# Patient Record
Sex: MALE | Race: BLACK OR AFRICAN AMERICAN | Employment: UNEMPLOYED | URBAN - METROPOLITAN AREA
[De-identification: names, ages, dates, MRNs, and addresses within clinical notes are randomized per-mention and may not be internally consistent; named-entity substitution may affect disease eponyms.]

---

## 2019-05-12 ENCOUNTER — APPOINTMENT (OUTPATIENT)
Dept: CT IMAGING | Age: 24
End: 2019-05-12
Payer: MEDICAID

## 2019-05-12 ENCOUNTER — HOSPITAL ENCOUNTER (OUTPATIENT)
Age: 24
Setting detail: OBSERVATION
Discharge: HOME | End: 2019-05-14
Attending: INTERNAL MEDICINE | Admitting: INTERNAL MEDICINE
Payer: MEDICAID

## 2019-05-12 DIAGNOSIS — G25.3 MYOCLONUS: Primary | ICD-10-CM

## 2019-05-12 PROBLEM — R56.9 SEIZURE (HCC): Status: ACTIVE | Noted: 2019-05-12

## 2019-05-12 LAB
ALBUMIN SERPL-MCNC: 4.8 G/DL (ref 3.5–4.6)
ALP BLD-CCNC: 70 U/L (ref 35–104)
ALT SERPL-CCNC: 19 U/L (ref 0–41)
AMPHETAMINE SCREEN, URINE: ABNORMAL
ANION GAP SERPL CALCULATED.3IONS-SCNC: 12 MEQ/L (ref 9–15)
AST SERPL-CCNC: 18 U/L (ref 0–40)
BARBITURATE SCREEN URINE: ABNORMAL
BASOPHILS ABSOLUTE: 0.1 K/UL (ref 0–0.2)
BASOPHILS RELATIVE PERCENT: 1.5 %
BENZODIAZEPINE SCREEN, URINE: ABNORMAL
BILIRUB SERPL-MCNC: <0.2 MG/DL (ref 0.2–0.7)
BILIRUBIN URINE: NEGATIVE
BLOOD, URINE: NEGATIVE
BUN BLDV-MCNC: 6 MG/DL (ref 6–20)
C-REACTIVE PROTEIN: <0.3 MG/L (ref 0–5)
CALCIUM SERPL-MCNC: 9.6 MG/DL (ref 8.5–9.9)
CANNABINOID SCREEN URINE: POSITIVE
CHLORIDE BLD-SCNC: 104 MEQ/L (ref 95–107)
CK MB: 2 NG/ML (ref 0–6.7)
CLARITY: CLEAR
CO2: 26 MEQ/L (ref 20–31)
COCAINE METABOLITE SCREEN URINE: ABNORMAL
COLOR: YELLOW
CREAT SERPL-MCNC: 1.08 MG/DL (ref 0.7–1.2)
CREATINE KINASE-MB INDEX: 0.4 % (ref 0–3.5)
EKG ATRIAL RATE: 75 BPM
EKG P AXIS: 56 DEGREES
EKG P-R INTERVAL: 156 MS
EKG Q-T INTERVAL: 368 MS
EKG QRS DURATION: 84 MS
EKG QTC CALCULATION (BAZETT): 410 MS
EKG R AXIS: 76 DEGREES
EKG T AXIS: 47 DEGREES
EKG VENTRICULAR RATE: 75 BPM
EOSINOPHILS ABSOLUTE: 0 K/UL (ref 0–0.7)
EOSINOPHILS RELATIVE PERCENT: 0.3 %
ETHANOL PERCENT: NORMAL G/DL
ETHANOL: <10 MG/DL (ref 0–0.08)
GFR AFRICAN AMERICAN: >60
GFR NON-AFRICAN AMERICAN: >60
GLOBULIN: 2.1 G/DL (ref 2.3–3.5)
GLUCOSE BLD-MCNC: 92 MG/DL (ref 70–99)
GLUCOSE URINE: NEGATIVE MG/DL
HCT VFR BLD CALC: 40.4 % (ref 42–52)
HEMOGLOBIN: 13.6 G/DL (ref 14–18)
KETONES, URINE: NEGATIVE MG/DL
LACTIC ACID: 1.2 MMOL/L (ref 0.5–2.2)
LEUKOCYTE ESTERASE, URINE: NEGATIVE
LYMPHOCYTES ABSOLUTE: 2.5 K/UL (ref 1–4.8)
LYMPHOCYTES RELATIVE PERCENT: 46.2 %
Lab: ABNORMAL
MAGNESIUM: 2.1 MG/DL (ref 1.7–2.4)
MCH RBC QN AUTO: 26.4 PG (ref 27–31.3)
MCHC RBC AUTO-ENTMCNC: 33.8 % (ref 33–37)
MCV RBC AUTO: 78.1 FL (ref 80–100)
MONOCYTES ABSOLUTE: 0.3 K/UL (ref 0.2–0.8)
MONOCYTES RELATIVE PERCENT: 5.9 %
NEUTROPHILS ABSOLUTE: 2.5 K/UL (ref 1.4–6.5)
NEUTROPHILS RELATIVE PERCENT: 46.1 %
NITRITE, URINE: NEGATIVE
OPIATE SCREEN URINE: ABNORMAL
PDW BLD-RTO: 14.4 % (ref 11.5–14.5)
PH UA: 5.5 (ref 5–9)
PHENCYCLIDINE SCREEN URINE: ABNORMAL
PLATELET # BLD: 201 K/UL (ref 130–400)
POTASSIUM SERPL-SCNC: 4.3 MEQ/L (ref 3.4–4.9)
PROLACTIN: 7.4 NG/ML (ref 4–15.2)
PROTEIN UA: NEGATIVE MG/DL
RBC # BLD: 5.17 M/UL (ref 4.7–6.1)
REJECTED TEST: NORMAL
SEDIMENTATION RATE, ERYTHROCYTE: 1 MM (ref 0–10)
SODIUM BLD-SCNC: 142 MEQ/L (ref 135–144)
SPECIFIC GRAVITY UA: 1.01 (ref 1–1.03)
TOTAL CK: 466 U/L (ref 0–190)
TOTAL PROTEIN: 6.9 G/DL (ref 6.3–8)
TROPONIN: <0.01 NG/ML (ref 0–0.01)
URINE REFLEX TO CULTURE: NORMAL
UROBILINOGEN, URINE: 0.2 E.U./DL
WBC # BLD: 5.5 K/UL (ref 4.8–10.8)

## 2019-05-12 PROCEDURE — 2580000003 HC RX 258: Performed by: NURSE PRACTITIONER

## 2019-05-12 PROCEDURE — 80053 COMPREHEN METABOLIC PANEL: CPT

## 2019-05-12 PROCEDURE — G0378 HOSPITAL OBSERVATION PER HR: HCPCS

## 2019-05-12 PROCEDURE — 6360000002 HC RX W HCPCS: Performed by: PHYSICIAN ASSISTANT

## 2019-05-12 PROCEDURE — 85652 RBC SED RATE AUTOMATED: CPT

## 2019-05-12 PROCEDURE — 83605 ASSAY OF LACTIC ACID: CPT

## 2019-05-12 PROCEDURE — 96365 THER/PROPH/DIAG IV INF INIT: CPT

## 2019-05-12 PROCEDURE — 82553 CREATINE MB FRACTION: CPT

## 2019-05-12 PROCEDURE — 70450 CT HEAD/BRAIN W/O DYE: CPT

## 2019-05-12 PROCEDURE — 96372 THER/PROPH/DIAG INJ SC/IM: CPT

## 2019-05-12 PROCEDURE — 82550 ASSAY OF CK (CPK): CPT

## 2019-05-12 PROCEDURE — 96374 THER/PROPH/DIAG INJ IV PUSH: CPT

## 2019-05-12 PROCEDURE — 86140 C-REACTIVE PROTEIN: CPT

## 2019-05-12 PROCEDURE — 81003 URINALYSIS AUTO W/O SCOPE: CPT

## 2019-05-12 PROCEDURE — 80307 DRUG TEST PRSMV CHEM ANLYZR: CPT

## 2019-05-12 PROCEDURE — 93005 ELECTROCARDIOGRAM TRACING: CPT

## 2019-05-12 PROCEDURE — 99284 EMERGENCY DEPT VISIT MOD MDM: CPT

## 2019-05-12 PROCEDURE — 96375 TX/PRO/DX INJ NEW DRUG ADDON: CPT

## 2019-05-12 PROCEDURE — 84484 ASSAY OF TROPONIN QUANT: CPT

## 2019-05-12 PROCEDURE — G0480 DRUG TEST DEF 1-7 CLASSES: HCPCS

## 2019-05-12 PROCEDURE — 36415 COLL VENOUS BLD VENIPUNCTURE: CPT

## 2019-05-12 PROCEDURE — 85025 COMPLETE CBC W/AUTO DIFF WBC: CPT

## 2019-05-12 PROCEDURE — 2580000003 HC RX 258: Performed by: PHYSICIAN ASSISTANT

## 2019-05-12 PROCEDURE — 84146 ASSAY OF PROLACTIN: CPT

## 2019-05-12 PROCEDURE — 83735 ASSAY OF MAGNESIUM: CPT

## 2019-05-12 RX ORDER — DIPHENHYDRAMINE HYDROCHLORIDE 50 MG/ML
50 INJECTION INTRAMUSCULAR; INTRAVENOUS ONCE
Status: COMPLETED | OUTPATIENT
Start: 2019-05-12 | End: 2019-05-12

## 2019-05-12 RX ORDER — SODIUM CHLORIDE 9 MG/ML
INJECTION, SOLUTION INTRAVENOUS CONTINUOUS
Status: DISCONTINUED | OUTPATIENT
Start: 2019-05-12 | End: 2019-05-14 | Stop reason: HOSPADM

## 2019-05-12 RX ORDER — LORAZEPAM 2 MG/ML
2 INJECTION INTRAMUSCULAR ONCE
Status: COMPLETED | OUTPATIENT
Start: 2019-05-12 | End: 2019-05-12

## 2019-05-12 RX ORDER — ONDANSETRON 2 MG/ML
4 INJECTION INTRAMUSCULAR; INTRAVENOUS EVERY 6 HOURS PRN
Status: DISCONTINUED | OUTPATIENT
Start: 2019-05-12 | End: 2019-05-14 | Stop reason: HOSPADM

## 2019-05-12 RX ORDER — SODIUM CHLORIDE 0.9 % (FLUSH) 0.9 %
10 SYRINGE (ML) INJECTION PRN
Status: DISCONTINUED | OUTPATIENT
Start: 2019-05-12 | End: 2019-05-14 | Stop reason: HOSPADM

## 2019-05-12 RX ORDER — LORAZEPAM 2 MG/ML
2 INJECTION INTRAMUSCULAR EVERY 4 HOURS PRN
Status: DISCONTINUED | OUTPATIENT
Start: 2019-05-12 | End: 2019-05-14 | Stop reason: HOSPADM

## 2019-05-12 RX ORDER — SODIUM CHLORIDE 0.9 % (FLUSH) 0.9 %
10 SYRINGE (ML) INJECTION EVERY 12 HOURS SCHEDULED
Status: DISCONTINUED | OUTPATIENT
Start: 2019-05-12 | End: 2019-05-14 | Stop reason: HOSPADM

## 2019-05-12 RX ADMIN — DIPHENHYDRAMINE HYDROCHLORIDE 50 MG: 50 INJECTION, SOLUTION INTRAMUSCULAR; INTRAVENOUS at 18:35

## 2019-05-12 RX ADMIN — LORAZEPAM 2 MG: 2 INJECTION, SOLUTION INTRAMUSCULAR; INTRAVENOUS at 18:35

## 2019-05-12 RX ADMIN — SODIUM CHLORIDE: 9 INJECTION, SOLUTION INTRAVENOUS at 21:49

## 2019-05-12 RX ADMIN — LEVETIRACETAM 1000 MG: 100 INJECTION, SOLUTION INTRAVENOUS at 20:56

## 2019-05-12 RX ADMIN — Medication 10 ML: at 21:48

## 2019-05-12 ASSESSMENT — ENCOUNTER SYMPTOMS
COLOR CHANGE: 0
SHORTNESS OF BREATH: 0
TROUBLE SWALLOWING: 0
EYE PAIN: 0
VOMITING: 0
APNEA: 0
ABDOMINAL PAIN: 0
BACK PAIN: 1
ALLERGIC/IMMUNOLOGIC NEGATIVE: 1
COUGH: 0

## 2019-05-12 ASSESSMENT — PAIN SCALES - GENERAL: PAINLEVEL_OUTOF10: 0

## 2019-05-12 NOTE — ED NOTES
Lab called and verified they received all bloodwork needed.       [facility], [address] [place] [address]  05/12/19 1943

## 2019-05-12 NOTE — ED NOTES
IV started, labs and urine obtained, labeled and sent. Pt tolerated well.  Pt to CT via cart with kehinde Ocasio, DEE  05/12/19 1935

## 2019-05-12 NOTE — ED PROVIDER NOTES
[facility] ED  eMERGENCY dEPARTMENTeNCOUnter      Pt Name: Fabiola Garg  MRN: [id number]  [place] 1995  Date ofevaluation: 5/12/2019  Provider: Braden Moreira PA-C    CHIEF COMPLAINT       Chief Complaint   Patient presents with    Other     Pt said he does not feel emotional but tears are coming out of his eyes. twitching while awake and asleep. 2 weeks ago pt had facial droop per father         HISTORY OF PRESENT ILLNESS   (Location/Symptom, Timing/Onset,Context/Setting, Quality, Duration, Modifying Factors, Severity)  Note limiting factors. Fabiola Garg is a 26 y.o. male who presents to the emergency department with complaints of involuntary body twitching/spasms, emotional lability, and dizziness, ongoing for the past 2-3 weeks. Dad states at symptom onset the patient had some drooping to the right side of the face, but that subsequently resolved. Patient denies any recent injuries. No recent illnesses. Only smokes marijuana, no other illicit drug use, alcohol abuse or tobacco use. Patient has no medical history, is not on any medications, and no history of episodes like this in the past. Denies any chest pain or shortness of breath. No abdominal pain, nausea or vomiting. Does have intermittent diarrhea. Poor appetite recently, but continues to drink water. Family states that jerking continues while patient is asleep. Varying severity. HPI    NursingNotes were reviewed. REVIEW OF SYSTEMS    (2-9 systems for level 4, 10 or more for level 5)     Review of Systems   Constitutional: Positive for appetite change and fatigue. Negative for diaphoresis and fever. HENT: Negative for hearing loss and trouble swallowing. Eyes: Negative for pain. Respiratory: Negative for apnea, cough and shortness of breath. Cardiovascular: Negative for chest pain. Gastrointestinal: Negative for abdominal pain and vomiting. Endocrine: Negative. Genitourinary: Negative for hematuria. Musculoskeletal: Positive for back pain. Negative for neck pain and neck stiffness. Skin: Negative for color change and rash. Allergic/Immunologic: Negative. Neurological: Positive for tremors and light-headedness. Negative for dizziness, syncope and numbness. Hematological: Negative. Psychiatric/Behavioral: Positive for decreased concentration. The patient is nervous/anxious. All other systems reviewed and are negative. Except as noted above the remainder of the review of systems was reviewed and negative. PAST MEDICAL HISTORY   History reviewed. No pertinent past medical history. SURGICALHISTORY     History reviewed. No pertinent surgical history. CURRENT MEDICATIONS       Previous Medications    No medications on file       ALLERGIES     Penicillins    FAMILY HISTORY     History reviewed. No pertinent family history.        SOCIAL HISTORY       Social History     Socioeconomic History    Marital status: Single     Spouse name: None    Number of children: None    Years of education: None    Highest education level: None   Occupational History    None   Social Needs    Financial resource strain: None    Food insecurity:     Worry: None     Inability: None    Transportation needs:     Medical: None     Non-medical: None   Tobacco Use    Smoking status: Former Smoker     Types: Cigarettes     Last attempt to quit: 3/12/2019     Years since quittin.1    Smokeless tobacco: Never Used   Substance and Sexual Activity    Alcohol use: Not Currently    Drug use: Yes     Types: Marijuana    Sexual activity: None   Lifestyle    Physical activity:     Days per week: None     Minutes per session: None    Stress: None   Relationships    Social connections:     Talks on phone: None     Gets together: None     Attends Bahai service: None     Active member of club or organization: None     Attends meetings of clubs or organizations: None     Relationship status: None    Intimate partner violence:     Fear of current or ex partner: None     Emotionally abused: None     Physically abused: None     Forced sexual activity: None   Other Topics Concern    None   Social History Narrative    None       SCREENINGS    Becky Coma Scale  Eye Opening: Spontaneous  Best Verbal Response: Oriented  Best Motor Response: Obeys commands  [place] Coma Scale Score: 15         PHYSICAL EXAM    (up to 7 for level 4, 8 or more for level 5)     ED Triage Vitals [05/12/19 1814]   BP Temp Temp Source Pulse Resp SpO2 Height Weight   (!) 145/88 98.3 °F (36.8 °C) Oral 111 18 100 % 6' 2\" (1.88 m) 200 lb (90.7 kg)       Physical Exam   Constitutional: He is oriented to person, place, and time. He appears well-developed and well-nourished. No distress. HENT:   Head: Normocephalic and atraumatic. Mouth/Throat: No oropharyngeal exudate. Eyes: Pupils are equal, round, and reactive to light. Conjunctivae and EOM are normal. No scleral icterus. Neck: Normal range of motion. Neck supple. No tracheal deviation present. Cardiovascular: Normal rate, normal heart sounds and intact distal pulses. Pulmonary/Chest: Effort normal and breath sounds normal. No respiratory distress. Abdominal: Soft. Bowel sounds are normal. He exhibits no distension. Musculoskeletal: Normal range of motion. Neurological: He is alert and oriented to person, place, and time. He displays tremor (myoclonic muscle contractions/tremors, disorganized). No cranial nerve deficit. He exhibits normal muscle tone. Gait abnormal. GCS eye subscore is 4. GCS verbal subscore is 5. GCS motor subscore is 6. Skin: Skin is warm and dry. No rash noted. He is not diaphoretic. No erythema. Psychiatric: He has a normal mood and affect. His behavior is normal. Judgment and thought content normal.   Nursing note and vitals reviewed.       DIAGNOSTIC RESULTS     EKG: All EKG's are interpreted by the Emergency Department Physician who either signs or Co-signsthis chart in the absence of a cardiologist.    NSR @ 75, No st elevation    RADIOLOGY:   Non-plain filmimages such as CT, Ultrasound and MRI are read by the radiologist. Plain radiographic images are visualized and preliminarily interpreted by the emergency physician with the below findings:    CT head- Neg    Interpretation per the Radiologist below, if available at the time ofthis note:    CT Head WO Contrast    (Results Pending)         ED BEDSIDE ULTRASOUND:   Performed by ED Physician - none    LABS:  Labs Reviewed   COMPREHENSIVE METABOLIC PANEL - Abnormal; Notable for the following components:       Result Value    Alb 4.8 (*)     Globulin 2.1 (*)     All other components within normal limits   CBC WITH AUTO DIFFERENTIAL - Abnormal; Notable for the following components:    Hemoglobin 13.6 (*)     Hematocrit 40.4 (*)     MCV 78.1 (*)     MCH 26.4 (*)     All other components within normal limits   CK - Abnormal; Notable for the following components: Total  (*)     All other components within normal limits   URINE DRUG SCREEN - Abnormal; Notable for the following components:    Cannabinoid Scrn, Ur POSITIVE (*)     All other components within normal limits   MAGNESIUM   LACTIC ACID, PLASMA   ETHANOL   URINE RT REFLEX TO CULTURE   TROPONIN   SEDIMENTATION RATE   C-REACTIVE PROTEIN   PROLACTIN   SPECIMEN REJECTION   CKMB & RELATIVE PERCENT       All other labs were within normal range or not returned as of this dictation. EMERGENCY DEPARTMENT COURSE and DIFFERENTIAL DIAGNOSIS/MDM:   Vitals:    Vitals:    05/12/19 1814 05/12/19 1950 05/12/19 2000   BP: (!) 145/88 116/65 120/67   Pulse: 111 67 70   Resp: 18 20 14   Temp: 98.3 °F (36.8 °C)     TempSrc: Oral     SpO2: 100% 100% 97%   Weight: 200 lb (90.7 kg)     Height: 6' 2\" (1.88 m)             MDM      REASSESSMENT      Patient did have improved symptoms after receiving IM Ativan and Benadryl.   I discussed the case with neurology who recommends giving 1 g of Keppra IV in admitting in the hospital for further workup and evaluation. CONSULTS:  None    PROCEDURES:  Unless otherwise noted below, none     Procedures    FINAL IMPRESSION      1. Myoclonus          DISPOSITION/PLAN   DISPOSITION Decision To Admit 05/12/2019 08:30:25 PM      PATIENT REFERREDTO:  No follow-up provider specified. DISCHARGEMEDICATIONS:  New Prescriptions    No medications on file     Controlled Substances Monitoring:     No flowsheet data found.     (Please note that portions of this note were completed with a voice recognition program.  Efforts were made to edit the dictations but occasionally words are mis-transcribed.)    Shana Oneil PA-C (electronically signed)  Attending Emergency Physician          Shana Oneil PA-C  05/12/19 5151

## 2019-05-13 ENCOUNTER — APPOINTMENT (OUTPATIENT)
Dept: MRI IMAGING | Age: 24
End: 2019-05-13
Payer: MEDICAID

## 2019-05-13 LAB
ANION GAP SERPL CALCULATED.3IONS-SCNC: 13 MEQ/L (ref 9–15)
BUN BLDV-MCNC: 8 MG/DL (ref 6–20)
CALCIUM SERPL-MCNC: 8.8 MG/DL (ref 8.5–9.9)
CHLORIDE BLD-SCNC: 110 MEQ/L (ref 95–107)
CO2: 23 MEQ/L (ref 20–31)
CREAT SERPL-MCNC: 1.05 MG/DL (ref 0.7–1.2)
GFR AFRICAN AMERICAN: >60
GFR NON-AFRICAN AMERICAN: >60
GLUCOSE BLD-MCNC: 98 MG/DL (ref 70–99)
HCT VFR BLD CALC: 39.2 % (ref 42–52)
HEMOGLOBIN: 13.2 G/DL (ref 14–18)
MCH RBC QN AUTO: 26.4 PG (ref 27–31.3)
MCHC RBC AUTO-ENTMCNC: 33.6 % (ref 33–37)
MCV RBC AUTO: 78.5 FL (ref 80–100)
PDW BLD-RTO: 14.4 % (ref 11.5–14.5)
PLATELET # BLD: 193 K/UL (ref 130–400)
POTASSIUM REFLEX MAGNESIUM: 4.4 MEQ/L (ref 3.4–4.9)
RBC # BLD: 5 M/UL (ref 4.7–6.1)
SODIUM BLD-SCNC: 146 MEQ/L (ref 135–144)
WBC # BLD: 5.8 K/UL (ref 4.8–10.8)

## 2019-05-13 PROCEDURE — 96372 THER/PROPH/DIAG INJ SC/IM: CPT

## 2019-05-13 PROCEDURE — 36415 COLL VENOUS BLD VENIPUNCTURE: CPT

## 2019-05-13 PROCEDURE — 6360000002 HC RX W HCPCS: Performed by: NURSE PRACTITIONER

## 2019-05-13 PROCEDURE — 6370000000 HC RX 637 (ALT 250 FOR IP): Performed by: PSYCHIATRY & NEUROLOGY

## 2019-05-13 PROCEDURE — 70551 MRI BRAIN STEM W/O DYE: CPT

## 2019-05-13 PROCEDURE — 85027 COMPLETE CBC AUTOMATED: CPT

## 2019-05-13 PROCEDURE — 95816 EEG AWAKE AND DROWSY: CPT

## 2019-05-13 PROCEDURE — 97166 OT EVAL MOD COMPLEX 45 MIN: CPT

## 2019-05-13 PROCEDURE — 96366 THER/PROPH/DIAG IV INF ADDON: CPT

## 2019-05-13 PROCEDURE — 80048 BASIC METABOLIC PNL TOTAL CA: CPT

## 2019-05-13 PROCEDURE — G0378 HOSPITAL OBSERVATION PER HR: HCPCS

## 2019-05-13 PROCEDURE — 96376 TX/PRO/DX INJ SAME DRUG ADON: CPT

## 2019-05-13 PROCEDURE — 97161 PT EVAL LOW COMPLEX 20 MIN: CPT

## 2019-05-13 PROCEDURE — 2580000003 HC RX 258: Performed by: NURSE PRACTITIONER

## 2019-05-13 RX ORDER — LORAZEPAM 2 MG/ML
1 INJECTION INTRAMUSCULAR
Status: COMPLETED | OUTPATIENT
Start: 2019-05-13 | End: 2019-05-13

## 2019-05-13 RX ADMIN — LEVETIRACETAM 500 MG: 100 INJECTION, SOLUTION INTRAVENOUS at 08:29

## 2019-05-13 RX ADMIN — SODIUM CHLORIDE 75 ML/HR: 9 INJECTION, SOLUTION INTRAVENOUS at 11:46

## 2019-05-13 RX ADMIN — ENOXAPARIN SODIUM 40 MG: 40 INJECTION SUBCUTANEOUS at 08:28

## 2019-05-13 RX ADMIN — LORAZEPAM 1 MG: 2 INJECTION INTRAMUSCULAR; INTRAVENOUS at 13:45

## 2019-05-13 RX ADMIN — LEVETIRACETAM 750 MG: 500 TABLET ORAL at 20:40

## 2019-05-13 ASSESSMENT — PAIN SCALES - GENERAL
PAINLEVEL_OUTOF10: 0

## 2019-05-13 NOTE — PROGRESS NOTES
Hospitalist Progress Note      PCP: No primary care provider on file. Date of Admission: 5/12/2019    Chief Complaint:    Chief Complaint   Patient presents with    Other     Pt said he does not feel emotional but tears are coming out of his eyes. twitching while awake and asleep. 2 weeks ago pt had facial droop per father     Subjective: :  Patient admitted with twitching of left side of body  Reports still has twitching and left side of body has decreased sensation. Medications:  Reviewed    Infusion Medications    sodium chloride 75 mL/hr at 05/12/19 0859     Scheduled Medications    sodium chloride flush  10 mL Intravenous 2 times per day    enoxaparin  40 mg Subcutaneous Daily    levetiracetam  500 mg Intravenous Q12H     PRN Meds: LORazepam, sodium chloride flush, magnesium hydroxide, ondansetron, LORazepam      Intake/Output Summary (Last 24 hours) at 5/13/2019 1023  Last data filed at 5/13/2019 0624  Gross per 24 hour   Intake 1000 ml   Output --   Net 1000 ml       Exam:    /65   Pulse 55   Temp 97.9 °F (36.6 °C) (Oral)   Resp 16   Ht 6' 3\" (1.905 m)   Wt 203 lb 0.7 oz (92.1 kg)   SpO2 100%   BMI 25.38 kg/m²     General appearance: No apparent distress  HEENT:  Conjunctivae/corneas clear. Neck: Supple, with full range of motion. Respiratory:  Normal respiratory effort. Clear to auscultation, bilaterally without Rales/Wheezes/Rhonchi. Cardiovascular: Regular rate and rhythm with normal S1/S2 without murmurs, rubs or gallops. Abdomen: Soft, non-tender, non-distended with normal bowel sounds. Musculoskeletal: No clubbing, cyanosis or edema bilaterally. Neuro: alert, EOMI, no facial palsy, strength 5/5 both sides. He was sleeping and had no twitching when I went in to the room. He started having significant jerking of the left side after I entered the room.        Labs:   Recent Labs     05/12/19  1845 05/13/19  0531   WBC 5.5 5.8   HGB 13.6* 13.2*   HCT 40.4* 39.2*   PLT Diet: DIET GENERAL; No Caffeine    Code Status: Full Code      Electronically signed by Ramon Muñoz MD on 5/13/2019 at 10:23 AM

## 2019-05-13 NOTE — PLAN OF CARE
See OT evaluation for all goals and OT POC.  Electronically signed by TRESSA Diego/L on 5/13/2019 at 3:43 PM

## 2019-05-13 NOTE — PROGRESS NOTES
Perfect served Dr Veronique Sidhu of pt's hr. Ramos Sewell goes down to 40s, lowest 39 but not sustaining, goes up to 46s. Pt sleeping. will monitor. No new ordered received.

## 2019-05-13 NOTE — CONSULTS
Consult to Neurology  Consult performed by: Bhanu Wang MD  Consult ordered by: Daniel Higgins, APRN - CNP      Myoclonus   R/o Juvenile myoclonic epilepsy  Psychogenic myoclonus    Alexandrecarmel Clark MD, Sidney Ni, American Board of Psychiatry & Neurology  Board Certified in Vascular Neurology  Board Certified in Neuromuscular Medicine  Certified in [address]

## 2019-05-13 NOTE — ED NOTES
Report called to [address] [place] [address] on [facility], tele applied, and pt ready for transport to [facility]     Adrian Sol RN  05/12/19 8129

## 2019-05-13 NOTE — H&P
Hospital Medicine History & Physical      PCP: No primary care provider on file. Date of Admission: 5/12/2019    Date of Service: Pt seen/examined on 5/12/19 and Admitted to Inpatient with expected LOS greater than two midnights due to medical therapy. Chief Complaint:  Tremors       History Of Present Illness:      26 y.o. male who presented to [facility] ED with complaint of tremors. He has no medical history and denies use of daily medications. He states use of marijuana occasionally and denies tobacco use and alcohol. Per patient he has been having spasm/twitching of left side extremities intermittently for the last 2-3 weeks. He has also been having emotional lability and states sometime when he talks he feels like he is about to cry for no reason. He denies feelings of depression. He denies any trauma/injury and denies hitting head. States twitching can last for a few minutes and resolve on their own other times that will last longer. unclear of what triggers them. Patient states he will have left side facial numbness along with left side tongue numbness intermittently as well. Per ED provider patient has witnessed twitching/spasm of left upper and lower extremities for at least 20 minutes in ED with relief finally from ativan. Patient denies cp./sob/n/v/d/fever/chills/rash. Past Medical History:      History reviewed. No pertinent past medical history. Past Surgical History:      History reviewed. No pertinent surgical history. Medications Prior to Admission:      Prior to Admission medications    Not on File       Allergies:  Penicillins    Social History:      The patient currently lives with parents      TOBACCO:   reports that he quit smoking about 2 months ago. His smoking use included cigarettes. He has never used smokeless tobacco.  ETOH:   reports that he drank alcohol. Family History:       Reviewed in detail and negative for DM, CAD, Cancer, CVA. History reviewed. No pertinent family history. REVIEW OF SYSTEMS:   Pertinent positives as noted in the HPI. All other systems reviewed and negative. PHYSICAL EXAM:    /86   Pulse 67   Temp 98.3 °F (36.8 °C) (Oral)   Resp 20   Ht 6' 2\" (1.88 m)   Wt 200 lb (90.7 kg)   SpO2 97%   BMI 25.68 kg/m²     General appearance:  No apparent distress, appears stated age and cooperative. HEENT:  Normal cephalic, atraumatic without obvious deformity. Pupils equal, round, and reactive to light. Extra ocular muscles intact. Conjunctivae/corneas clear. Neck: Supple, with full range of motion. No jugular venous distention. Trachea midline. Respiratory:  Normal respiratory effort. Clear to auscultation, bilaterally   Cardiovascular:  Regular rate and rhythm with normal S1/S2  Abdomen: Soft, non-tender, non-distended with normal bowel sounds. Musculoskeletal:  No clubbing, cyanosis or edema bilaterally. Skin: Skin color, texture, turgor normal.  No rashes or lesions. Neurologic:  Neurovascularly intact without any focal sensory/motor deficits. Cranial nerves: II-XII intact, grossly non-focal. Noted left upper and lower twitching/tremors, no facial droop or slurred speech, equal  and strengths with all extremities, numbness to left side of face and left side of tongue. Psychiatric:  Alert and oriented, thought content appropriate, normal insight  Capillary Refill: Brisk,< 3 seconds   Peripheral Pulses: +2 palpable, equal bilaterally       Labs:     Recent Labs     05/12/19 1845   WBC 5.5   HGB 13.6*   HCT 40.4*        Recent Labs     05/12/19 1845      K 4.3      CO2 26   BUN 6   CREATININE 1.08   CALCIUM 9.6     Recent Labs     05/12/19 1845   AST 18   ALT 19   BILITOT <0.2   ALKPHOS 70     No results for input(s): INR in the last 72 hours.   Recent Labs     05/12/19 1845   CKTOTAL 466*   TROPONINI <0.010       Urinalysis:      Lab Results   Component Value Date    NITRU Negative 05/12/2019 dehydrated.      Darol Dose, [facility]

## 2019-05-13 NOTE — CONSULTS
[address]                      [address], [address] [facility]                                  CONSULTATION    PATIENT NAME: Tam Ritchie                      :        1995  MED REC NO:   [id number]                            ROOM:       A299  ACCOUNT NO:   [de-identified]                           ADMIT DATE: 2019  PROVIDER:     Pablo Maria MD    CONSULT DATE:  2019    ATTENDING:  Belkys Velasco MD.    HISTORY OF PRESENT ILLNESS:  This is a 28-year-old right-handed  gentleman who is admitted with history of jerking episodes. The patient  had significant jerking in the emergency room. We were called regarding  the same. We had given him lot of Keppra. Here, he is somewhat better. The patient does not use any recreational drugs except for marijuana. When seen today, he was having some jerking. We tapped his knee  reflexes, and he had increased jerking and significant myoclonus, though  again when I did not tap his knee, he still had the reaction. He is  complaining of some emotional ability and dizziness ongoing for 2 to 3  weeks. The patient was noted to have some droopiness of the right face,  but subsequently resolved. This was not quite well documented. He  denies any headaches, nausea, vomiting, bleeding, bruising, choking,  drooling, falls, injuries, or trauma. He complains of left leg  weakness, which is intermittent. He denies any significant back pain. He has no injury or history of trauma. MEDICATIONS:  His medications are listed. We gave him Keppra only. He  was not taking anything other than he is on lorazepam when needed. PHYSICAL EXAMINATION:  GENERAL:  He is tall, well-built gentleman. General examination reveals  no skin lesions or skin marks. There is no pedal edema. There is no  cyanosis or clubbing. NECK:  Supple. There are no meningeal signs.   CARDIOVASCULAR SYSTEM:  S1 and S2 are normal.  No murmurs appreciated. No carotid bruits. RESPIRATORY SYSTEM:  Clear to auscultation. CNS:  He is awake and alert and oriented to self, place, month, and  year. Pupils are equal and reactive. Eye movements are conjugate. Speech is normal.  Tongue is midline. Palate moves symmetrically. EXTREMITIES:  No pronator drift. Fine finger movements are symmetrical.  Strength is 5/5. Reflexes are 2+. No myelopathic signs are notable. On tapping his reflexes, he had significant myoclonic jerks, but even  without the tapping, he had a jerk. LAB EXAMINATION:  Therefore reviewed. His CPK level is 466 with a white  count of 5.5, hemoglobin 13.6, hematocrit 40.4, and platelets of  894,218. Sodium 142, potassium 4.3, BUN of 6, creatinine 1.08. Prolactin level is 7.4.  C-reactive protein was 0.3. Magnesium was 2.1. Lactic acid of 1.2. IMPRESSION:  Myoclonic jerks of unclear etiology. 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 Underlying _____ on myoclonic epilepsy  must be ruled out. An MRI of the brain and EEG are therefore  recommended. 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 We will keep an eye on this since he does have some stressors as it  appears from his chart. There are some other symptoms, which quite do  not fit into the myoclonus category. Segmental myoclonus can occur with  cervical compressions, though he does not have any myelopathic signs. We will keep an eye on this since he _____ something. Thank you Dr. Naty Chaidez for asking us to assist in the care of this  patient.       Lyndsey Domínguez MD    D: 05/13/2019 12:07:09       T: 05/13/2019 12:54:53     [email or web address]  Job#: [id number]     Doc#: [id number]    CC:

## 2019-05-13 NOTE — CARE COORDINATION
Patient is from home with family, independent. He qualifies for 2 weeks new meds through [employer]. Plan is home at discharge.   Electronically signed by Chhaya Fernandez RN on 5/13/19 at 12:16 PM

## 2019-05-13 NOTE — PROGRESS NOTES
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 History reviewed. No pertinent past medical history. History reviewed. No pertinent surgical history.     Restrictions  Restrictions/Precautions: Fall Risk, Seizure     Safety Devices: Safety Devices  Safety Devices in place: Not Applicable(Bed alarm not on when OT arrived)    Subjective  Pre Treatment Pain Screening  Pain at present: 0  Scale Used: Numeric Score  Intervention List: Patient able to continue with treatment    Pain Reassessment:   Pain Assessment  Patient Currently in Pain: No  Pain Assessment: 0-10  Pain Level: 0       Orientation  Orientation  Overall Orientation Status: Within Functional Limits    Prior Level of Function:  Social/Functional History  Lives With: Family  Type of Home: House  Home Layout: Multi-level, Bed/Bath upstairs(5-6 steps with landings between levels)  Home Access: Stairs to enter with rails  Entrance Stairs - Number of Steps: 3  Bathroom Shower/Tub: Tub/Shower unit  Bathroom Toilet: Standard  ADL Assistance: Independent  Homemaking Assistance: Independent  Ambulation Assistance: Independent(No AD)  Transfer Assistance: Independent  Active : No  Occupation: Unemployed  Additional Comments: Pt. states he is currently not working, the tremors have been impacting his mobility only the last few days    OBJECTIVE:     Orientation Status:  Orientation  Overall Orientation Status: Within Functional Limits    Observation:  Observation/Palpation  Posture: Good  Observation: Pt. alert and attentive, sitting upright in bed; occasional twitching with intentional movement    Cognition Status:  Cognition  Overall Cognitive Status: WFL    Perception Status:  Perception  Overall Perceptual Status: [facility]    Sensation Status:  Sensation  Overall Sensation Status: WNL    Vision and Hearing Status:  Vision  Vision: Within Functional Limits  Hearing  Hearing: Within functional limits     ROM:   LUE AROM (degrees)  LUE AROM : WFL  Left Hand AROM (degrees)  Left Hand AROM: WFL  RUE AROM (degrees)  RUE AROM : WFL  Right Hand AROM (degrees)  Right Hand AROM: WFL    Strength:  LUE Strength  Gross LUE Strength: WNL  L Hand Grasp: 5/5  L Hand Release: 5/5  LUE Strength Comment: 5/5 all planes  RUE Strength  R Hand Grasp: 5/5  R Hand Release: 5/5  RUE Strength Comment: 5/5 all planes    Coordination, Tone, Quality of Movement: Tone RUE  RUE Tone: Normotonic  Tone LUE  LUE Tone: Normotonic  Coordination  Movements Are Fluid And Coordinated: No  Coordination and Movement description: Gross motor impairments, Decreased speed, Decreased accuracy, Right UE, Left UE  Quality of Movement Other  Comment: Occasional twitching with all purposeful movements    Hand Dominance:  Hand Dominance  Hand Dominance: Right    ADL Status:  ADL  Feeding: Independent  Grooming: Setup  UE Bathing: Setup  LE Bathing: Contact guard assistance  UE Dressing: Setup  LE Dressing: Contact guard assistance  Toileting: Contact guard assistance  Additional Comments: Simulated ADLs as above; pt's twitching impact his ability to safely perform all mobility and ADL activities. Pt. requires increased time for all tasks.    Toilet Transfers  Toilet Transfer: Unable to assess  Toilet Transfers Comments: Pt. declined need; anticipate SBA with increased time       Functional Mobility:  Functional Mobility  Functional - Mobility Device: No device  Activity: To/from bathroom  Assist Level: Contact guard assistance  Functional Mobility Comments: Varies SBA to CGA; twitching impacts his safety  Transfers  Sit to stand: Stand by assistance  Stand to sit: Stand by assistance    Bed Mobility  Bed mobility  Supine to Sit: Stand by assistance  Sit to Supine: Stand by assistance    Seated and Standing Balance:  Balance  Sitting Balance: Supervision  Standing Balance: Stand by assistance(Close SBA)    Functional Endurance:  Activity Tolerance  Activity Tolerance: Patient Tolerated treatment well    D/C Recommendations:    Equipment Recommendations:  Continue to assess    OT Follow Up:  OT D/C RECOMMENDATIONS  REQUIRES OT FOLLOW UP: Yes       Assessment/Discharge Disposition:  Assessment: Pt is a 26year old man from home with family who presents to [facility] with the above deficits which impact his ability to perform ADLs and IADLs. Pt. would benefit from skilled OT to maximize independence and safety with ADL tasks.    Performance deficits / Impairments: Decreased functional mobility , Decreased ADL status, Decreased safe awareness, Decreased balance, Decreased high-level IADLs, Decreased coordination  Prognosis: Good  Discharge Recommendations: Continue to assess pending progress  History: Pt's medical history is moderately complex  Exam: Pt. has 6 performance deficits  Assistance / Modification: Pt. requires SBA to CGA    Six Click Score   How much help for putting on and taking off regular lower body clothing?: A Little  How much help for Bathing?: A Little  How much help for Toileting?: A Little  How much help for putting on and taking off regular upper body clothing?: None  How much help for taking care of personal grooming?: None  How much help for eating meals?: None  AM-[facility] Inpatient Daily Activity Raw Score: 21  AM-PAC Inpatient ADL T-Scale Score : 44.27  ADL Inpatient CMS 0-100% Score: 32.79    Plan:  Plan  Times per week: 1-3x  Plan weeks: Length of acute stay  Current Treatment Recommendations: Endurance Training, Balance Training, Functional Mobility Training, Patient/Caregiver Education & Training, Neuromuscular Re-education, Equipment Evaluation, Education, & procurement, Self-Care / ADL, Home Management Training, Safety Education & Training    Goals:   Patient will:    - Improve functional endurance to tolerate/complete 60 mins of ADL's  - Be Independent in UB ADLs   - Be Mod I in LB ADLs  - Be Mod I in ADL transfers without LOB  - Be Mod I in toileting tasks  - Access appropriate D/C site with as few architectural barriers as possible. - Sequence self-care tasks with no verbal cues for safety    Patient Goal: Patient goals : \"I want to get home\"     Discussed and agreed upon: Yes Comments:     Therapy Time:   OT Individual Minutes  Time In: 1525  Time Out: [address]  Minutes: 8    Electronically signed by:     SKYLER Peñaloza  5/13/2019, 3:42 PM

## 2019-05-13 NOTE — FLOWSHEET NOTE
Medicated patient with Ativan 1 mg iv once patient on cot to go down to MRI. Transport taking patient down now.

## 2019-05-13 NOTE — ED NOTES
Pharmacy called for One Rodney Phelps, [facility]  05/12/19 9729

## 2019-05-13 NOTE — PROGRESS NOTES
Physical Therapy Med Surg Initial Assessment  Facility/Department: [facility]  Room: N227/N227-01       NAME: Zackery Comment  : 1995 (26 y.o.)  MRN: [id number]  CODE STATUS: Full Code    Date of Service: 2019    Patient Diagnosis(es): Seizure [facility]) [R56.9]   Chief Complaint   Patient presents with    Other     Pt said he does not feel emotional but tears are coming out of his eyes. twitching while awake and asleep. 2 weeks ago pt had facial droop per father     Patient Active Problem List    Diagnosis Date Noted    Seizure [facility]) 2019        History reviewed. No pertinent past medical history. History reviewed. No pertinent surgical history. Chart Reviewed: Yes  Patient assessed for rehabilitation services?: Yes  General Comment  Comments: Pt agreeable to PT evaluation. Restrictions:  Restrictions/Precautions: Fall Risk, Seizure     SUBJECTIVE: Subjective: \"I have trouble telling my leg what to do. \"       Post Treatment Pain Screening:   Pain Screening  Patient Currently in Pain: No(Simultaneous filing. User may not have seen previous data.)  Pain Assessment  Pain Assessment: 0-10(Simultaneous filing. User may not have seen previous data.)  Pain Level: 0(Simultaneous filing.  User may not have seen previous data.)    Prior Level of Function:  Social/Functional History  Lives With: Family  Type of Home: House  Home Layout: Multi-level, Bed/Bath upstairs(5-6 steps with landings between levels)  Home Access: Stairs to enter with rails  Entrance Stairs - Number of Steps: 3  Bathroom Shower/Tub: Tub/Shower unit  Bathroom Toilet: Standard  ADL Assistance: Independent  Homemaking Assistance: Independent  Ambulation Assistance: Independent(No AD)  Transfer Assistance: Independent  Active : No  Occupation: Unemployed  Additional Comments: Pt. states he is currently not working, the tremors have been impacting his mobility only the last few days    OBJECTIVE:   Vision/Hearing:  Vision: Within Functional Limits  Hearing: Within functional limits    Cognition:  Overall Orientation Status: Within Normal Limits  Follows Commands: Within Functional Limits    Observation/Palpation  Posture: Good  Observation: occasional twitching, jerking motion    ROM:  RLE PROM: WFL  LLE PROM: WFL    Strength:  Strength RLE  Comment: grossly 4/5  Strength LLE  Comment: grossly 4/5    Neuro:  Balance  Sitting - Static: Good  Sitting - Dynamic: Good  Standing - Static: Good;-  Standing - Dynamic: Good;-        Sensation  Overall Sensation Status: WNL    Bed mobility  Supine to Sit: Stand by assistance  Sit to Supine: Stand by assistance    Transfers  Sit to Stand: Stand by assistance  Stand to sit: Stand by assistance    Ambulation  Ambulation?: Yes  Ambulation 1  Surface: level tile  Device: No Device  Assistance: Stand by assistance;Contact guard assistance  Quality of Gait: LOB x 1 but able to recover with CGA, step to gait pattern, occasionally twitching in LEs during ambulation  Distance: 15 ft x 2           ASSESSMENT:   Body structures, Functions, Activity limitations: Decreased functional mobility ; Decreased balance;Decreased coordination  Decision Making: Low Complexity  History: low  Exam: medium  Clinical Presentation: evolving    Prognosis: Good  Patient Education: goals of PT    DISCHARGE RECOMMENDATIONS:  Discharge Recommendations: Continue to assess pending progress    Assessment: Patient demonstrates impaired gait with LOB needing CGA to maintain safety. Further skilled PT recommended to continue to work on gait and balance to return to independent level.   REQUIRES PT FOLLOW UP: Yes      PLAN OF CARE:  Plan  Times per week: 3-6  Current Treatment Recommendations: Strengthening, Balance Training, Functional Mobility Training, Endurance Training, Transfer Training, Gait Training, Stair training, Neuromuscular Re-education, Home Exercise Program, Safety Education & Training, Patient/Caregiver Education & Training, Equipment Evaluation, Education, & procurement  Safety Devices  Type of devices: All fall risk precautions in place, Call light within reach    Goals:  Long term goals  Long term goal 1: Patient will be independent with bed mobility. Long term goal 2: Patient will be independent with transfers. Long term goal 3: Patient will be independent with 150ft of gait using LRD. Long term goal 4: Patient will ascend/descend 4-6inch steps x 3 using HR independently.     [facility] (6 CLICK) BASIC MOBILITY  AM-PAC Inpatient Mobility Raw Score : 22     Therapy Time:   Individual   Time In 1523   Time Out 1533   Minutes Jaden Villanueva, 05/13/19 at 3:46 PM

## 2019-05-13 NOTE — ED NOTES
Pt talking on telephone to a woman stating \" I dont even want to be here, they made me come here and I dont even want to be here. Person on phone asking pt what medication he is getting and what doses. Pt stating they are giving me all this stuff and what if I dont even need this stuff. Pt asking about medication, Medication explained to pt, pt stating what if I'm not even having seizures, asked pt if he would like to speak with physician/PA again pt states he wants to talk to them. Magda Verma I PA notified.       [address] [place] [address]  05/12/19 6062

## 2019-05-13 NOTE — ED NOTES
Patient A&Ox4, frequent twitching noted, patient states he feels like something is crawling on his forehead, c/o intermittent tingling of left side of body for several weeks     Guillermo Mcfadden RN  05/12/19 2010       Guillermo Mcfadden RN  05/12/19 2016

## 2019-05-13 NOTE — PROGRESS NOTES
[facility] Occupational Therapy      Date: 2019  Patient Name: Portillo Gutierrez        MRN: [id number]  Account: [de-identified]   : 1995  (26 y.o.)  Room: N227N227-01    Pt. is of observation status. To comply with medicare and insurance regulations, to see patient we require updated orders including a valid OT treatment diagnosis. Please reorder as appropriate.      Electronically signed by SKYLER Lehman on 2019 at 10:23 AM

## 2019-05-13 NOTE — PROGRESS NOTES
2135 Pt to 227 via cart. Oriented to room, call light. Pt denies any pain, no chest pain, sob. Family at bedside. 2150 Pt eating, no distress noted. 80 Dr Oliver Vo at bedside. Notified of assessment. Pt stated that his whole left side sensation is decreased, and per pt's dad that pt had facial drop 2 weeks ago and he has not been \"right\" for past few months. Pt also lost about 25 lbs.

## 2019-05-14 VITALS
HEIGHT: 75 IN | OXYGEN SATURATION: 100 % | BODY MASS INDEX: 25.25 KG/M2 | DIASTOLIC BLOOD PRESSURE: 78 MMHG | RESPIRATION RATE: 18 BRPM | SYSTOLIC BLOOD PRESSURE: 122 MMHG | HEART RATE: 74 BPM | TEMPERATURE: 98.6 F | WEIGHT: 203.04 LBS

## 2019-05-14 LAB
ANION GAP SERPL CALCULATED.3IONS-SCNC: 9 MEQ/L (ref 9–15)
BUN BLDV-MCNC: 9 MG/DL (ref 6–20)
CALCIUM SERPL-MCNC: 8.9 MG/DL (ref 8.5–9.9)
CHLORIDE BLD-SCNC: 106 MEQ/L (ref 95–107)
CO2: 27 MEQ/L (ref 20–31)
CREAT SERPL-MCNC: 1.13 MG/DL (ref 0.7–1.2)
GFR AFRICAN AMERICAN: >60
GFR NON-AFRICAN AMERICAN: >60
GLUCOSE BLD-MCNC: 96 MG/DL (ref 70–99)
HCT VFR BLD CALC: 40.1 % (ref 42–52)
HEMOGLOBIN: 13.6 G/DL (ref 14–18)
MCH RBC QN AUTO: 26.2 PG (ref 27–31.3)
MCHC RBC AUTO-ENTMCNC: 33.8 % (ref 33–37)
MCV RBC AUTO: 77.5 FL (ref 80–100)
PDW BLD-RTO: 14.6 % (ref 11.5–14.5)
PLATELET # BLD: 198 K/UL (ref 130–400)
POTASSIUM REFLEX MAGNESIUM: 5 MEQ/L (ref 3.4–4.9)
RBC # BLD: 5.18 M/UL (ref 4.7–6.1)
SODIUM BLD-SCNC: 142 MEQ/L (ref 135–144)
TSH SERPL DL<=0.05 MIU/L-ACNC: 1.98 UIU/ML (ref 0.44–3.86)
WBC # BLD: 5.4 K/UL (ref 4.8–10.8)

## 2019-05-14 PROCEDURE — 84443 ASSAY THYROID STIM HORMONE: CPT

## 2019-05-14 PROCEDURE — 86225 DNA ANTIBODY NATIVE: CPT

## 2019-05-14 PROCEDURE — 97116 GAIT TRAINING THERAPY: CPT

## 2019-05-14 PROCEDURE — 6360000002 HC RX W HCPCS: Performed by: NURSE PRACTITIONER

## 2019-05-14 PROCEDURE — 36415 COLL VENOUS BLD VENIPUNCTURE: CPT

## 2019-05-14 PROCEDURE — 99243 OFF/OP CNSLTJ NEW/EST LOW 30: CPT | Performed by: PSYCHIATRY & NEUROLOGY

## 2019-05-14 PROCEDURE — 80048 BASIC METABOLIC PNL TOTAL CA: CPT

## 2019-05-14 PROCEDURE — 6370000000 HC RX 637 (ALT 250 FOR IP): Performed by: PSYCHIATRY & NEUROLOGY

## 2019-05-14 PROCEDURE — 2580000003 HC RX 258: Performed by: NURSE PRACTITIONER

## 2019-05-14 PROCEDURE — 85027 COMPLETE CBC AUTOMATED: CPT

## 2019-05-14 PROCEDURE — G0378 HOSPITAL OBSERVATION PER HR: HCPCS

## 2019-05-14 PROCEDURE — 83516 IMMUNOASSAY NONANTIBODY: CPT

## 2019-05-14 PROCEDURE — 96372 THER/PROPH/DIAG INJ SC/IM: CPT

## 2019-05-14 RX ADMIN — LEVETIRACETAM 750 MG: 500 TABLET ORAL at 09:18

## 2019-05-14 RX ADMIN — SODIUM CHLORIDE: 9 INJECTION, SOLUTION INTRAVENOUS at 05:43

## 2019-05-14 RX ADMIN — ENOXAPARIN SODIUM 40 MG: 40 INJECTION SUBCUTANEOUS at 09:18

## 2019-05-14 ASSESSMENT — PAIN SCALES - GENERAL
PAINLEVEL_OUTOF10: 0
PAINLEVEL_OUTOF10: 0

## 2019-05-14 ASSESSMENT — ENCOUNTER SYMPTOMS
CHEST TIGHTNESS: 0
SHORTNESS OF BREATH: 0
DIARRHEA: 0
ABDOMINAL DISTENTION: 0
TROUBLE SWALLOWING: 0
COUGH: 0
VOMITING: 0
NAUSEA: 0
WHEEZING: 0
ABDOMINAL PAIN: 0
CONSTIPATION: 0
COLOR CHANGE: 0
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
## 2019-05-14 NOTE — PROGRESS NOTES
Hospitalist Progress Note      PCP: No primary care provider on file. Date of Admission: 5/12/2019    Subjective: :  Denies any complaints    Medications:  Reviewed    Infusion Medications    sodium chloride 75 mL/hr at 05/14/19 0543     Scheduled Medications    levETIRAcetam  750 mg Oral BID    sodium chloride flush  10 mL Intravenous 2 times per day    enoxaparin  40 mg Subcutaneous Daily     PRN Meds: sodium chloride flush, magnesium hydroxide, ondansetron, LORazepam      Intake/Output Summary (Last 24 hours) at 5/14/2019 0816  Last data filed at 5/13/2019 1942  Gross per 24 hour   Intake 1750 ml   Output --   Net 1750 ml       Exam:    /78   Pulse 74   Temp 98.6 °F (37 °C) (Oral)   Resp 18   Ht 6' 3\" (1.905 m)   Wt 203 lb 0.7 oz (92.1 kg)   SpO2 100%   BMI 25.38 kg/m²     General appearance: No apparent distress  HEENT:  Conjunctivae/corneas clear. Neck: Supple, with full range of motion. Respiratory:  Normal respiratory effort. Clear to auscultation, bilaterally without Rales/Wheezes/Rhonchi. Cardiovascular: Regular rate and rhythm with normal S1/S2 without murmurs, rubs or gallops. Abdomen: Soft, non-tender, non-distended with normal bowel sounds. Musculoskeletal: No clubbing, cyanosis or edema bilaterally. Neuro: alert, EOMI, no facial palsy, strength 5/5 both sides. Was sleeping without any myoclonic jerks but after I woke up him, he started having muscle jerks. Labs:   Recent Labs     05/12/19 1845 05/13/19 0531 05/14/19  0545   WBC 5.5 5.8 5.4   HGB 13.6* 13.2* 13.6*   HCT 40.4* 39.2* 40.1*    193 198     Recent Labs     05/12/19 1845 05/13/19  0531 05/14/19  0545    146* 142   K 4.3 4.4 5.0*    110* 106   CO2 26 23 27   BUN 6 8 9   CREATININE 1.08 1.05 1.13   CALCIUM 9.6 8.8 8.9     Recent Labs     05/12/19 1845   AST 18   ALT 19   BILITOT <0.2   ALKPHOS 70     No results for input(s): INR in the last 72 hours.   Recent Labs     05/12/19  2454 CKTOTAL [address] <0.010       Urinalysis:      Lab Results   Component Value Date    NITRU Negative 05/12/2019    BLOODU Negative 05/12/2019    SPECGRAV 1.006 05/12/2019    GLUCOSEU Negative 05/12/2019       Radiology:  MRI brain without contrast   Final Result      NEGATIVE NONCONTRAST HEAD MRI. CT Head WO Contrast   Final Result   Impression:      Negative CT brain. All CT scans at this facility use dose modulation, iterative reconstruction, and/or weight based dosing when appropriate to reduce radiation dose to as low as reasonably achievable. Assessment/Plan:    Active Hospital Problems    Diagnosis Date Noted    Seizure [facility]) [R56.9] 05/12/2019          27 yo male with history of marijauana use who presented with twitching of the left side.      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   - electrolytes normal  - urine tox screen positive for marijuana  - CT head negative. MRI negative. - EEG pending  - On keppra 500mg BID  - neurology following. Additional work up or/and treatment plan may be added today or then after based on clinical progression. I am managing a portion of pt care. Some medical issues are handled by other specialists. Additional work up and treatment should be done in out pt setting by pt PCP and other out pt providers. In addition to examining and evaluating pt, I spent additional time explaining care, normal and abnormal findings, and treatment plan. All of pt questions were answered. Counseling, diet and education were  provided. Case will be discussed with nursing staff when appropriate. Family will be updated if and when appropriate.       Diet: DIET GENERAL; No Caffeine    Code Status: Full Code      Electronically signed by Lex Estrada MD on 5/14/2019 at 8:16 AM
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
## 2019-05-14 NOTE — DISCHARGE SUMMARY
Discharge instructions reviewed with patient. Patient aware that he needs to make follow up appts to see a couselor at the [facility] and with Dr. Harrison Fitzpatrick in six weeks. IV removed with no complications. Tele removed. Patient anxious to leave and independent and turning off alarms. He has no questions at this time and no s/s of seizure activity, twitching or tremors at this time.   Electronically signed by Itzel Mcgee RN on 5/14/2019 at 3:18 PM

## 2019-05-14 NOTE — FLOWSHEET NOTE
Pt is sleeping, sinus bradycardia dipping into the 40s. Pt is asymptomatic. Will continue to monitor.    Electronically signed by Amparo Borjas RN on 5/14/19 at 3:50 AM
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
## 2019-05-15 NOTE — PROCEDURES
[address]                      [address], [address] [facility]                          ELECTROENCEPHALOGRAM REPORT    PATIENT NAME: Nicole Arteaga                      :        1995  MED REC NO:   [id number]                            ROOM:       N227  ACCOUNT NO:   [de-identified]                           ADMIT DATE: 2019  PROVIDER:     Jus Sol MD    DATE OF EE2019    MEDICATIONS:  Keppra and Lovenox. This is a spontaneous 21-channel EEG recording for this patient with a  history of seizures. The patient was admitted with myoclonic jerks. The background rhythm shows 8 to 9 Hz posterior dominant rhythm of  alpha. This is symmetrical and attenuates with eye opening. In between  this, there appear to be muscle artifacts and intermittent occasional  single myoclonic jerks. This appears to be muscle activity and not  cortical.  Some of this only represents a few leads. Photic stimulation was performed without any photic response. There is  no photic response to seizures. There are no other types of seizure  activity. IMPRESSION:  This is an abnormal EEG recording by the virtue of  myoclonic jerks which are muscle artifacts and not cortical.  These  findings are not suggestive of cortical myoclonus or myoclonic epilepsy. Clinical correlation is recommended.       Catherine Nugent MD    D: 05/15/2019 12:30:01       T: 05/15/2019 13:25:05     [email or web address]  Job#: [id number]     Doc#: [id number]    CC:

## 2019-05-16 NOTE — PROGRESS NOTES
Physical Therapy  Facility/Department: [facility] MED SURG G798/[id number]  Physical Therapy Discharge      NAME: Derek Garcia    : 1995 (26 y.o.)  MRN: [id number]    Account: [de-identified]  Gender: male      Patient has been discharged from acute care hospital. DC patient from current PT program.      Electronically signed by Ludy Herman PT on 19 at 8:57 AM

## 2019-05-17 LAB
ANTIPHOSPHOLIPID AB IGG: 6 GPL (ref 0–14)
ANTIPHOSPHOLIPID AB IGM: 1 MPL (ref 0–14)
DOUBLE STRANDED DNA AB, IGG: NORMAL

## 2022-12-27 NOTE — PROGRESS NOTES
Pharmacy requesting refill on flomax  Last seen 8/24/22  Medication sent to pharmacy declined to attempt, pt has no concerns regarding stair climbing. )                                ASSESSMENT:  Body structures, Functions, Activity limitations: Decreased functional mobility ; Decreased balance;Decreased coordination    Assessment: pt able to increase ambulation distance without issue but was limited by occasional muscle jerks during ambulation    Activity Tolerance  Activity Tolerance: Patient Tolerated treatment well       Discharge Recommendations:  Continue to assess pending progress    Goals:  Long term goals  Long term goal 1: Patient will be independent with bed mobility. Long term goal 2: Patient will be independent with transfers. Long term goal 3: Patient will be independent with 150ft of gait using LRD. Long term goal 4: Patient will ascend/descend 4-6inch steps x 3 using HR independently. PLAN:   Plan  Times per week: 3-6  Current Treatment Recommendations: Strengthening, Balance Training, Functional Mobility Training, Endurance Training, Transfer Training, Gait Training, Stair training, Neuromuscular Re-education, Home Exercise Program, Safety Education & Training, Patient/Caregiver Education & Training, Equipment Evaluation, Education, & procurement  Safety Devices  Type of devices:  All fall risk precautions in place, Call light within reach, Chair alarm in place, Left in chair     [facility] (6 CLICK) Sofia 95 Raw Score : 22      Therapy Time   Individual   Time In 0842   Time Out 0858   Minutes 16      Gait: 13  BM/Trsf: 3        Catia Crespo PTA, 05/14/19 at 9:06 AM

## 2023-10-10 ENCOUNTER — HOSPITAL ENCOUNTER (INPATIENT)
Age: 28
LOS: 6 days | Discharge: HOME | DRG: 885 | End: 2023-10-16
Attending: PSYCHIATRY & NEUROLOGY | Admitting: PSYCHIATRY & NEUROLOGY
Payer: COMMERCIAL

## 2023-10-10 DIAGNOSIS — F33.1 MODERATE EPISODE OF RECURRENT MAJOR DEPRESSIVE DISORDER (HCC): Primary | ICD-10-CM

## 2023-10-10 PROBLEM — F32.9 MAJOR DEPRESSIVE DISORDER WITH CURRENT ACTIVE EPISODE, UNSPECIFIED DEPRESSION EPISODE SEVERITY, UNSPECIFIED WHETHER RECURRENT: Status: ACTIVE | Noted: 2023-10-10

## 2023-10-10 LAB
ALBUMIN SERPL-MCNC: 5.4 G/DL (ref 3.5–4.6)
ALP SERPL-CCNC: 81 U/L (ref 35–104)
ALT SERPL-CCNC: 14 U/L (ref 0–41)
AMPHET UR QL SCN: ABNORMAL
ANION GAP SERPL CALCULATED.3IONS-SCNC: 14 MEQ/L (ref 9–15)
APAP SERPL-MCNC: <5 UG/ML (ref 10–30)
AST SERPL-CCNC: 15 U/L (ref 0–40)
BACTERIA URNS QL MICRO: NEGATIVE /HPF
BARBITURATES UR QL SCN: ABNORMAL
BASOPHILS # BLD: 0.1 K/UL (ref 0–0.2)
BASOPHILS NFR BLD: 1.2 %
BENZODIAZ UR QL SCN: ABNORMAL
BILIRUB SERPL-MCNC: 0.4 MG/DL (ref 0.2–0.7)
BILIRUB UR QL STRIP: NEGATIVE
BUN SERPL-MCNC: 12 MG/DL (ref 6–20)
CALCIUM SERPL-MCNC: 10 MG/DL (ref 8.5–9.9)
CANNABINOIDS UR QL SCN: POSITIVE
CHLORIDE SERPL-SCNC: 101 MEQ/L (ref 95–107)
CHOLEST SERPL-MCNC: 244 MG/DL (ref 0–199)
CK SERPL-CCNC: 250 U/L (ref 0–190)
CLARITY UR: ABNORMAL
CO2 SERPL-SCNC: 25 MEQ/L (ref 20–31)
COCAINE UR QL SCN: ABNORMAL
COLOR UR: ABNORMAL
CREAT SERPL-MCNC: 1.43 MG/DL (ref 0.7–1.2)
CRYSTALS URNS MICRO: ABNORMAL /HPF
DRUG SCREEN COMMENT UR-IMP: ABNORMAL
EOSINOPHIL # BLD: 0 K/UL (ref 0–0.7)
EOSINOPHIL NFR BLD: 0.2 %
EPI CELLS #/AREA URNS AUTO: ABNORMAL /HPF (ref 0–5)
ERYTHROCYTE [DISTWIDTH] IN BLOOD BY AUTOMATED COUNT: 14 % (ref 11.5–14.5)
ETHANOL PERCENT: NORMAL G/DL
ETHANOLAMINE SERPL-MCNC: <10 MG/DL (ref 0–0.08)
FENTANYL SCREEN, URINE: ABNORMAL
FINE GRAN CASTS #/AREA URNS HPF: ABNORMAL /LPF (ref 0–5)
GLOBULIN SER CALC-MCNC: 3.1 G/DL (ref 2.3–3.5)
GLUCOSE SERPL-MCNC: 96 MG/DL (ref 70–99)
GLUCOSE UR STRIP-MCNC: NEGATIVE MG/DL
HCT VFR BLD AUTO: 46.1 % (ref 42–52)
HDLC SERPL-MCNC: 26 MG/DL (ref 40–59)
HGB BLD-MCNC: 15.6 G/DL (ref 14–18)
HGB UR QL STRIP: NEGATIVE
HYALINE CASTS #/AREA URNS AUTO: ABNORMAL /HPF (ref 0–5)
HYALINE CASTS #/AREA URNS LPF: ABNORMAL /LPF (ref 0–5)
KETONES UR STRIP-MCNC: >=80 MG/DL
LDLC SERPL CALC-MCNC: 179 MG/DL (ref 0–129)
LEUKOCYTE ESTERASE UR QL STRIP: NEGATIVE
LYMPHOCYTES # BLD: 1.5 K/UL (ref 1–4.8)
LYMPHOCYTES NFR BLD: 24.9 %
MCH RBC QN AUTO: 24.5 PG (ref 27–31.3)
MCHC RBC AUTO-ENTMCNC: 33.8 % (ref 33–37)
MCV RBC AUTO: 72.4 FL (ref 79–92.2)
METHADONE UR QL SCN: ABNORMAL
MONOCYTES # BLD: 0.4 K/UL (ref 0.2–0.8)
MONOCYTES NFR BLD: 6.8 %
MUCOUS THREADS URNS QL MICRO: PRESENT /LPF
NEUTROPHILS # BLD: 4 K/UL (ref 1.4–6.5)
NEUTS SEG NFR BLD: 66.2 %
NITRITE UR QL STRIP: NEGATIVE
OPIATES UR QL SCN: ABNORMAL
OXYCODONE UR QL SCN: ABNORMAL
PCP UR QL SCN: ABNORMAL
PH UR STRIP: 5.5 [PH] (ref 5–9)
PLATELET # BLD AUTO: 278 K/UL (ref 130–400)
POTASSIUM SERPL-SCNC: 4.2 MEQ/L (ref 3.4–4.9)
PROPOXYPH UR QL SCN: ABNORMAL
PROT SERPL-MCNC: 8.5 G/DL (ref 6.3–8)
PROT UR STRIP-MCNC: 100 MG/DL
RBC # BLD AUTO: 6.37 M/UL (ref 4.7–6.1)
RBC #/AREA URNS AUTO: ABNORMAL /HPF (ref 0–5)
SALICYLATES SERPL-MCNC: <0.3 MG/DL (ref 15–30)
SODIUM SERPL-SCNC: 140 MEQ/L (ref 135–144)
SP GR UR STRIP: 1.04 (ref 1–1.03)
TRIGL SERPL-MCNC: 196 MG/DL (ref 0–150)
TSH SERPL-MCNC: 1.04 UIU/ML (ref 0.44–3.86)
URINE REFLEX TO CULTURE: ABNORMAL
UROBILINOGEN UR STRIP-ACNC: 1 E.U./DL
WBC # BLD AUTO: 6 K/UL (ref 4.8–10.8)
WBC #/AREA URNS AUTO: ABNORMAL /HPF (ref 0–5)

## 2023-10-10 PROCEDURE — 80179 DRUG ASSAY SALICYLATE: CPT

## 2023-10-10 PROCEDURE — 82550 ASSAY OF CK (CPK): CPT

## 2023-10-10 PROCEDURE — 82077 ASSAY SPEC XCP UR&BREATH IA: CPT

## 2023-10-10 PROCEDURE — 80143 DRUG ASSAY ACETAMINOPHEN: CPT

## 2023-10-10 PROCEDURE — 80307 DRUG TEST PRSMV CHEM ANLYZR: CPT

## 2023-10-10 PROCEDURE — 81001 URINALYSIS AUTO W/SCOPE: CPT

## 2023-10-10 PROCEDURE — 36415 COLL VENOUS BLD VENIPUNCTURE: CPT

## 2023-10-10 PROCEDURE — 6370000000 HC RX 637 (ALT 250 FOR IP): Performed by: PHYSICIAN ASSISTANT

## 2023-10-10 PROCEDURE — 1240000000 HC EMOTIONAL WELLNESS R&B

## 2023-10-10 PROCEDURE — 80061 LIPID PANEL: CPT

## 2023-10-10 PROCEDURE — 99285 EMERGENCY DEPT VISIT HI MDM: CPT

## 2023-10-10 PROCEDURE — 84443 ASSAY THYROID STIM HORMONE: CPT

## 2023-10-10 PROCEDURE — 80053 COMPREHEN METABOLIC PANEL: CPT

## 2023-10-10 PROCEDURE — 85025 COMPLETE CBC W/AUTO DIFF WBC: CPT

## 2023-10-10 RX ORDER — MAGNESIUM HYDROXIDE/ALUMINUM HYDROXICE/SIMETHICONE 120; 1200; 1200 MG/30ML; MG/30ML; MG/30ML
30 SUSPENSION ORAL PRN
Status: DISCONTINUED | OUTPATIENT
Start: 2023-10-10 | End: 2023-10-16 | Stop reason: HOSPADM

## 2023-10-10 RX ORDER — LORAZEPAM 2 MG/1
2 TABLET ORAL ONCE
Status: COMPLETED | OUTPATIENT
Start: 2023-10-10 | End: 2023-10-10

## 2023-10-10 RX ORDER — HALOPERIDOL 5 MG/ML
5 INJECTION INTRAMUSCULAR EVERY 6 HOURS PRN
Status: DISCONTINUED | OUTPATIENT
Start: 2023-10-10 | End: 2023-10-16 | Stop reason: HOSPADM

## 2023-10-10 RX ORDER — HALOPERIDOL 5 MG/1
5 TABLET ORAL EVERY 6 HOURS PRN
Status: DISCONTINUED | OUTPATIENT
Start: 2023-10-10 | End: 2023-10-16 | Stop reason: HOSPADM

## 2023-10-10 RX ORDER — HYDROXYZINE PAMOATE 50 MG/1
50 CAPSULE ORAL EVERY 6 HOURS PRN
Status: DISCONTINUED | OUTPATIENT
Start: 2023-10-10 | End: 2023-10-16 | Stop reason: HOSPADM

## 2023-10-10 RX ORDER — TRAZODONE HYDROCHLORIDE 50 MG/1
50 TABLET ORAL NIGHTLY PRN
Status: DISCONTINUED | OUTPATIENT
Start: 2023-10-10 | End: 2023-10-16 | Stop reason: HOSPADM

## 2023-10-10 RX ORDER — BENZTROPINE MESYLATE 1 MG/ML
2 INJECTION INTRAMUSCULAR; INTRAVENOUS 2 TIMES DAILY PRN
Status: DISCONTINUED | OUTPATIENT
Start: 2023-10-10 | End: 2023-10-16 | Stop reason: HOSPADM

## 2023-10-10 RX ORDER — HYDROXYZINE HYDROCHLORIDE 50 MG/ML
50 INJECTION, SOLUTION INTRAMUSCULAR EVERY 6 HOURS PRN
Status: DISCONTINUED | OUTPATIENT
Start: 2023-10-10 | End: 2023-10-16 | Stop reason: HOSPADM

## 2023-10-10 RX ORDER — ACETAMINOPHEN 325 MG/1
650 TABLET ORAL EVERY 4 HOURS PRN
Status: DISCONTINUED | OUTPATIENT
Start: 2023-10-10 | End: 2023-10-16 | Stop reason: HOSPADM

## 2023-10-10 RX ADMIN — LORAZEPAM 2 MG: 2 TABLET ORAL at 16:10

## 2023-10-10 ASSESSMENT — ENCOUNTER SYMPTOMS
ABDOMINAL PAIN: 0
CONSTIPATION: 0
RHINORRHEA: 0
ABDOMINAL DISTENTION: 0
EYE DISCHARGE: 0
SHORTNESS OF BREATH: 0
SORE THROAT: 0
COLOR CHANGE: 0

## 2023-10-10 ASSESSMENT — PAIN SCALES - GENERAL: PAINLEVEL_OUTOF10: 0

## 2023-10-10 ASSESSMENT — LIFESTYLE VARIABLES
HOW OFTEN DO YOU HAVE A DRINK CONTAINING ALCOHOL: MONTHLY OR LESS
HOW MANY STANDARD DRINKS CONTAINING ALCOHOL DO YOU HAVE ON A TYPICAL DAY: 1 OR 2

## 2023-10-10 NOTE — ED TRIAGE NOTES
Pt brought in by [facility] due to SI   Pt is not forth coming and not cooperating with triage questions at this time   Pt is in hand cuffs at this time and unable to obtain vitals at this time

## 2023-10-10 NOTE — ED PROVIDER NOTES
Nose: No congestion. Mouth/Throat:      Mouth: Mucous membranes are moist.      Pharynx: No oropharyngeal exudate or posterior oropharyngeal erythema. Eyes:      Extraocular Movements: Extraocular movements intact. Conjunctiva/sclera: Conjunctivae normal.      Pupils: Pupils are equal, round, and reactive to light. Neck:      Vascular: No JVD. Trachea: No tracheal deviation. Cardiovascular:      Rate and Rhythm: Normal rate. Pulses: Normal pulses. Heart sounds: Normal heart sounds. No murmur heard. No friction rub. No gallop. Pulmonary:      Effort: Pulmonary effort is normal. No tachypnea, accessory muscle usage, respiratory distress or retractions. Breath sounds: No stridor. No wheezing, rhonchi or rales. Chest:      Chest wall: No tenderness. Abdominal:      General: Abdomen is flat. Bowel sounds are normal. There is no distension or abdominal bruit. Palpations: There is no shifting dullness, fluid wave, hepatomegaly, splenomegaly, mass or pulsatile mass. Tenderness: There is no abdominal tenderness. There is no right CVA tenderness, left CVA tenderness, guarding or rebound. Negative signs include Roman's sign, Rovsing's sign and McBurney's sign. Musculoskeletal:         General: No deformity. Cervical back: Normal range of motion and neck supple. No rigidity. Skin:     General: Skin is warm and dry. Capillary Refill: Capillary refill takes less than 2 seconds. Coloration: Skin is not jaundiced. Neurological:      General: No focal deficit present. Mental Status: He is alert and oriented to person, place, and time. Mental status is at baseline. Cranial Nerves: No cranial nerve deficit. Sensory: No sensory deficit. Motor: No weakness.       Coordination: Coordination normal.   Psychiatric:         Mood and Affect: Mood normal.      Comments: Patient is alert, calm, cooperative, makes poor eye contact, denies
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
## 2023-10-10 NOTE — ED NOTES
Spoke with pts mom who was out in waiting room before pt. arrived. Reports that pt has been living in his car for the past 7 or 8 months. States that she received a call while at work that he was going to jump off a bridge. Mom reports that she has been telling him for years that he needs help but pt refuses, has poor insight. Also reports that prior to living in his car that he was living with her but refuses to allow him to stay there anymore because he has threatening to kill her in the past. Also reports that he made homicidal threats towards his ex-girlfriend. States they broke up 3 weeks ago and feels this is making his issues worse. Reports he has a very toxic relationship with his ex-girlfriend, even when they were together. Reports no physical violence known in the past, just verbal threats.       Geronimo Acuña RN  10/10/23 9346

## 2023-10-10 NOTE — ED NOTES
Patient to be admitted to 3w room 384. Report given to Venkat Fernández, reviewed current/past history and labs. Patient to go up to floor after shift change.       Uri Chavez RN  10/10/23 6592

## 2023-10-10 NOTE — ED NOTES
Call placed to Dr. Pablo Carrasco, reviewed patient current/past history,labs, jyotsna to approve indigent bed. New orders to admit to 3W.      Irene Tai RN  10/10/23 4098

## 2023-10-10 NOTE — ED NOTES
Pt sitting in chair by phone. Pt tearful at times. Cooperative and some anxiety. Did not want medication at this time.      Alma Corea RN  10/10/23 1533       Alma Corea RN  10/10/23 4051

## 2023-10-10 NOTE — ED NOTES
Changed into [facility] clothing with no skin breakdown noted & no contraband found @ this time. Denies need to void. PO fluids given. Oriented to MARINA. Verbalizes understanding.      Jeff Dumont RN  10/10/23 6800

## 2023-10-10 NOTE — ED NOTES
Call placed to [facility] to review information for possible indigent bed.  Information faxed to Porfirio Amezquita, [address]  10/10/23 1531

## 2023-10-10 NOTE — ED NOTES
Patient escorted to the unit by [employer], uncuffed at the door. Changed into psych safe clothing, belongings obtained. Unable to provide urine specimen at this time. Fluids given. Patient to [facility] bed 2.       Benedicto Kemp RN  10/10/23 2839

## 2023-10-11 PROCEDURE — 6370000000 HC RX 637 (ALT 250 FOR IP): Performed by: PSYCHIATRY & NEUROLOGY

## 2023-10-11 PROCEDURE — 1240000000 HC EMOTIONAL WELLNESS R&B

## 2023-10-11 PROCEDURE — 99223 1ST HOSP IP/OBS HIGH 75: CPT | Performed by: PSYCHIATRY & NEUROLOGY

## 2023-10-11 RX ADMIN — HYDROXYZINE PAMOATE 50 MG: 50 CAPSULE ORAL at 16:11

## 2023-10-11 ASSESSMENT — PATIENT HEALTH QUESTIONNAIRE - PHQ9
SUM OF ALL RESPONSES TO PHQ QUESTIONS 1-9: 14
9. THOUGHTS THAT YOU WOULD BE BETTER OFF DEAD, OR OF HURTING YOURSELF: 2
SUM OF ALL RESPONSES TO PHQ QUESTIONS 1-9: 14
3. TROUBLE FALLING OR STAYING ASLEEP: 1
SUM OF ALL RESPONSES TO PHQ QUESTIONS 1-9: 14
SUM OF ALL RESPONSES TO PHQ QUESTIONS 1-9: 12
1. LITTLE INTEREST OR PLEASURE IN DOING THINGS: 2
10. IF YOU CHECKED OFF ANY PROBLEMS, HOW DIFFICULT HAVE THESE PROBLEMS MADE IT FOR YOU TO DO YOUR WORK, TAKE CARE OF THINGS AT HOME, OR GET ALONG WITH OTHER PEOPLE: 2
2. FEELING DOWN, DEPRESSED OR HOPELESS: 1
5. POOR APPETITE OR OVEREATING: 2
6. FEELING BAD ABOUT YOURSELF - OR THAT YOU ARE A FAILURE OR HAVE LET YOURSELF OR YOUR FAMILY DOWN: 1
SUM OF ALL RESPONSES TO PHQ9 QUESTIONS 1 & 2: 3
8. MOVING OR SPEAKING SO SLOWLY THAT OTHER PEOPLE COULD HAVE NOTICED. OR THE OPPOSITE, BEING SO FIGETY OR RESTLESS THAT YOU HAVE BEEN MOVING AROUND A LOT MORE THAN USUAL: 1
4. FEELING TIRED OR HAVING LITTLE ENERGY: 2
7. TROUBLE CONCENTRATING ON THINGS, SUCH AS READING THE NEWSPAPER OR WATCHING TELEVISION: 2

## 2023-10-11 ASSESSMENT — SLEEP AND FATIGUE QUESTIONNAIRES
DO YOU HAVE DIFFICULTY SLEEPING: NO
AVERAGE NUMBER OF SLEEP HOURS: 6
SLEEP PATTERN: NORMAL
DO YOU USE A SLEEP AID: NO

## 2023-10-11 ASSESSMENT — LIFESTYLE VARIABLES
HOW OFTEN DO YOU HAVE A DRINK CONTAINING ALCOHOL: 2-4 TIMES A MONTH
HOW MANY STANDARD DRINKS CONTAINING ALCOHOL DO YOU HAVE ON A TYPICAL DAY: 1 OR 2

## 2023-10-11 NOTE — GROUP NOTE
Group Therapy Note    Date: 10/11/2023    Group Start Time: 6311  Group End Time: 0202  Group Topic: Healthy Living/Wellness    [facility] 3W Israel Hoyt RN        Group Therapy Note    Attendees: 8/19       Patient's Goal:      Notes:      Status After Intervention:  Improved    Participation Level:  Active Listener    Participation Quality: Appropriate      Speech:  normal      Thought Process/Content: Logical      Affective Functioning: Congruent      Mood: depressed      Level of consciousness:  Alert      Response to Learning: Capable of insight      Endings: None Reported    Modes of Intervention: Problem-solving      Discipline Responsible: Registered Nurse      Signature:  Abelino Saeed RN

## 2023-10-11 NOTE — H&P
3 [facility] - Department of Psychiatry    History and Physical - Adult         CHIEF COMPLAINT:  Depression SI    History obtained from:  patient    Patient was seen after discussing with the treatment team and reviewing the chart        CIRCUMSTANCES OF ADMISSION:     Patient brought into the ED by [employer] on a pink slip. Patient was found walking by bridge after he had told his ex-girlfriend that he was going to jump off a bridge or suicide by . Patient reports he does feel suicidal and thinking of what he could do but has no plan. Recently broke up with his girlfriend and feels it is just too much and he can't handle  it. Patient flat, has poor eye contact, depressed mood. Anxious and tearful but is cooperative. Spoke with pts mom who was out in waiting room before pt. arrived. Reports that pt has been living in his car for the past 7 or 8 months. States that she received a call while at work that he was going to jump off a bridge. Mom reports that she has been telling him for years that he needs help but pt refuses, has poor insight. Also reports that prior to living in his car that he was living with her but refuses to allow him to stay there anymore because he has threatening to kill her in the past. Also reports that he made homicidal threats towards his ex-girlfriend. States they broke up 3 weeks ago and feels this is making his issues worse. Reports he has a very toxic relationship with his ex-girlfriend, even when they were together. Reports no physical violence known in the past, just verbal threats. HISTORY OF PRESENT ILLNESS:      The patient is a 34 y.o. 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 His GF was cheating on him for many months. Pt has daughter from relationship, want his child to have mom and dad  Pt forgave her past and tried to live with her. A month

## 2023-10-11 NOTE — CONSULTS
HISTORY AND PHYSICAL             Date: 10/11/2023        Patient Name: Lake Montiel     YOB: 1995      Age:  34 y.o. Chief Complaint     Chief Complaint   Patient presents with    Mental Health Problem     SI per [facility]        History Obtained From   patient, electronic medical record    History of Present Illness   Patient is a 32-year-old male who was brought in by [employer] for suicide ideation with a plan. Per EMR patient was found parked [place] to [place]; patient reported that he was arguing with his children's mother and  was very upset. Police were concerned that patient was going to harm himself by jumping off the bridge and therefore he was brought to the emergency department for evaluation. Past conversations with behavioral health nurse and family revealed patient has made previous threats  to jump off bridge or to hang himself. Patient currently admitted to inpatient psych for further evaluation. Patient denies chest pain, palpitations, lightheadedness, headache, dizziness, shortness of breath, cough, N/V/D, and changes in appetite. Patient also denies smoking, illicit drug, and alcohol use. Denies self-inflicted injuries or wounds. Patient has a PMX of seizures. Hospitalist consulted for acute and chronic disease management. Past Medical History   History reviewed. No pertinent past medical history. Past Surgical History   History reviewed. No pertinent surgical history.      Medications Prior to Admission     Prior to Admission medications    Not on File        Allergies   Penicillins and Soybean-containing drug products    Social History     Social History       Tobacco History       Smoking Status  Former Quit Date  3/12/2019 Smoking Tobacco Type  Cigarettes quit in 3/12/2019      Smokeless Tobacco Use  Never              Alcohol History       Alcohol Use Status  Not Currently Drinks/Week  2 Cans of beer per week Amount  2.0 standard drinks of alcohol/wk

## 2023-10-11 NOTE — ED NOTES
Woke patient. He was not aware he was going to be admitted to [id number] [place], [place] [address]fernando Barbour RN reviewed pink slip and process.  Escorted to [address] [place] [address] with [facility] D[facility] and [address], [address] [place], [place]  10/10/23 2010

## 2023-10-11 NOTE — CARE COORDINATION
Psychosocial Assessment    Current Level of Psychosocial Functioning     Independent X  Dependent    Minimal Assist     Comments:  Per ER Note Patient is homeless currently living in his car for the past 8 months. Prior to this lived with Mom but reports he is unable to stay there any longer. Born and raised in [place]. Attended highschool but dropped out in the 10 th grade. Never . Has 1 child. Unknown if patient works. Psychosocial High Risk Factors (check all that apply)    Unable to obtain meds   Chronic illness/pain    Substance abuse X  Lack of Family Support X  Financial stress X  Isolation X  Inadequate Community Resources  Suicide attempt(s)  Not taking medications   Victim of crime   Developmental Delay  Unable to manage personal needs    Age 72 or older   Homeless   No transportation   Readmission within 30 days  Unemployment  Traumatic Event    Family/Supports identified:   Girlfriend [facility]   Mother    Sexual Orientation:   Heterosexual     Patient Strengths:  employment    Patient Barriers:   Poor insight  Not receiving treatment      Safety plan: Completed    CMHC/MH history:  Major depressive disorder    Plan of Care:  medication management, group/individual therapies, family meetings, psycho -education, treatment team meetings to assist with stabilization    Initial Discharge Plan:  Return to Uncle's house (His car is there). Patient reports he \"lives\" in [facility] with his girlfriend; however works in [employer] and while working, sleeps in his car./ [facility]    Clinical Summary:    Patient is a 34year old male recently admitted to the [facility] for a mental health evaluation. Patient reports he was having an argument with his girlfriend and became upset and parked his car near the [facility].  Patient denies any past or current SI and reports he was trying to walk off his anger and frustration and had no intention of jumping from

## 2023-10-11 NOTE — CARE COORDINATION
Patient's mother came to  patient's phone per his request.  Pt standing at the nurses station struggling whether or not to give it to her while she waits off the unit. 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 Pt states his girlfriend is going to pick it up from his mom. Pt answered \"yes\" when nurse asked if there were things on his phone he didn't want the girlfriend to see. Pt decided to give the phone to his mother and let whatever is going to happen, happen.

## 2023-10-11 NOTE — GROUP NOTE
Group Therapy Note    Date: 10/11/2023    Group Start Time: 1320  Group End Time: 1350  Group Topic: Recreational    [facility] 3[facility] Nagi Hoyt, RN        Group Therapy Note    Attendees: 5/19       Patient's Goal:      Notes:      Status After Intervention:  Improved    Participation Level:  Active Listener    Participation Quality: Appropriate      Speech:  normal      Thought Process/Content: Logical      Affective Functioning: Congruent      Mood: anxious      Level of consciousness:  Alert      Response to Learning: Capable of insight      Endings: None Reported    Modes of Intervention: Socialization      Discipline Responsible: Registered Nurse      Signature:  Kiki Kumar RN

## 2023-10-11 NOTE — CARE COORDINATION
10/11/2023 @ 1326 - Patient was approached regarding completing the Leisure Assessment. Patient presented as cooperative, friendly, and engaged. When asked about his top three leisure activities, patient stated he is busy building intergenerational wealth. 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 The stressful event that brought patient to the hospital was the loss of significant income. He stated this admission was a mistake. Patient stated he was not attempting self harm. He is hoping to be released soon. Patient stated his strength is being extremely motivated towards being successful in the real estate business. Patient stated his challenge is interpersonal relationships. His girlfriend does not understand how motivated and focused he needs to be to become wealthy. Patient remained cooperative and discharged focused the majority of the session.      Electronically signed by YANNA Marroquin on 10/11/2023 at 1:36 PM

## 2023-10-12 PROBLEM — F31.9 BIPOLAR DEPRESSION (HCC): Status: ACTIVE | Noted: 2023-10-10

## 2023-10-12 PROCEDURE — 1240000000 HC EMOTIONAL WELLNESS R&B

## 2023-10-12 PROCEDURE — 6370000000 HC RX 637 (ALT 250 FOR IP): Performed by: PSYCHIATRY & NEUROLOGY

## 2023-10-12 PROCEDURE — 99232 SBSQ HOSP IP/OBS MODERATE 35: CPT | Performed by: PSYCHIATRY & NEUROLOGY

## 2023-10-12 RX ORDER — DIVALPROEX SODIUM 500 MG/1
500 TABLET, EXTENDED RELEASE ORAL DAILY
Status: DISCONTINUED | OUTPATIENT
Start: 2023-10-12 | End: 2023-10-16 | Stop reason: HOSPADM

## 2023-10-12 RX ADMIN — DIVALPROEX SODIUM 500 MG: 500 TABLET, EXTENDED RELEASE ORAL at 13:35

## 2023-10-12 RX ADMIN — TRAZODONE HYDROCHLORIDE 50 MG: 50 TABLET ORAL at 21:10

## 2023-10-12 NOTE — CARE COORDINATION
Family/Support Name: Olena Ruiz #: [phone]  Relationship to Pt[de-identified] Girlfriend     Placed call to above for collateral information,    Response:  No answer, unable to leave message as mailbox is full.

## 2023-10-12 NOTE — GROUP NOTE
Group Therapy Note    Date: 10/12/2023    Group Start Time: 1900  Group End Time: 2000  Group Topic: Recreational    [facility] MIKKI Mcgarry        Group Therapy Note    Attendees: 8/20       Patient's Goal:  PT to attend group    Status After Intervention:  Unchanged    Participation Level: Interactive    Participation Quality: Attentive      Speech:  normal      Thought Process/Content: Logical      Affective Functioning: Congruent      Mood: euthymic      Level of consciousness:  Attentive      Response to Learning: Able to verbalize current knowledge/experience      Endings: None Reported    Modes of Intervention: Activity      Discipline Responsible: PCA      Signature:  Loco Mcgarry

## 2023-10-12 NOTE — GROUP NOTE
Group Therapy Note    Date: 10/12/2023    Group Start Time: 1700  Group End Time: 5941  Group Topic: Healthy Living/Wellness    [facility] FABIANOI    Harpreet Mercer        Group Therapy Note    Attendees: 13/19       Patient's Goal:  TO learn more about socializing and feelings        Status After Intervention:  Unchanged    Participation Level: Interactive    Participation Quality: Attentive      Speech:  normal      Thought Process/Content: Logical      Affective Functioning: Congruent      Mood: euthymic      Level of consciousness:  Attentive      Response to Learning: Able to verbalize current knowledge/experience      Endings: None Reported    Modes of Intervention: Socialization      Discipline Responsible: PCA      Signature:  Harpreet Mercer

## 2023-10-13 PROCEDURE — 6370000000 HC RX 637 (ALT 250 FOR IP): Performed by: PSYCHIATRY & NEUROLOGY

## 2023-10-13 PROCEDURE — 99232 SBSQ HOSP IP/OBS MODERATE 35: CPT | Performed by: PSYCHIATRY & NEUROLOGY

## 2023-10-13 PROCEDURE — 90833 PSYTX W PT W E/M 30 MIN: CPT | Performed by: PSYCHIATRY & NEUROLOGY

## 2023-10-13 PROCEDURE — 1240000000 HC EMOTIONAL WELLNESS R&B

## 2023-10-13 RX ADMIN — TRAZODONE HYDROCHLORIDE 50 MG: 50 TABLET ORAL at 21:23

## 2023-10-13 RX ADMIN — DIVALPROEX SODIUM 500 MG: 500 TABLET, EXTENDED RELEASE ORAL at 08:43

## 2023-10-13 NOTE — GROUP NOTE
Group Therapy Note    Date: 10/13/2023    Group Start Time: 1330  Group End Time: 1400  Group Topic: Healthy Living/Wellness    [facility]    Mendel Gather, RN        Group Therapy Note    Attendees: 8/19         Status After Intervention:  Unchanged    Participation Level:  Active Listener and Interactive    Participation Quality: Appropriate      Speech:  normal      Thought Process/Content: Logical      Affective Functioning: Congruent      Mood: depressed      Level of consciousness:  Alert and Oriented x4      Response to Learning: Able to verbalize current knowledge/experience      Endings: None Reported    Modes of Intervention: Education      Discipline Responsible: Registered Nurse      Signature:  Mendel Gather, RN

## 2023-10-13 NOTE — GROUP NOTE
Group Therapy Note    Date: 10/13/2023    Group Start Time: 1000  Group End Time: 1100  Group Topic: Activity    [facility] 3[facility] Alla Ott        Group Therapy Note    Attendees: 10       Patient's Goal:  To attend group    Notes:  Pt was attentive     Status After Intervention:  Unchanged    Participation Level: None    Participation Quality: Appropriate      Speech:  mute      Thought Process/Content: Logical      Affective Functioning: Flat      Mood: euthymic      Level of consciousness:  Alert      Response to Learning: Able to verbalize current knowledge/experience      Endings: None Reported    Modes of Intervention: Activity      Discipline Responsible: Behavorial Health Tech      Signature:   Danika Clark

## 2023-10-13 NOTE — GROUP NOTE
Group Therapy Note    Date: 10/13/2023    Group Start Time: 4978  Group End Time: 5820  Group Topic: Nursing    [facility]    Mary Caballero RN        Group Therapy Note    Attendees: 9/19       Patient's Goal:  Learning supportive coping skills through socialization.     Status After Intervention:  Unchanged    Participation Level: Minimal    Participation Quality: Attentive      Speech:  hesitant      Thought Process/Content: Logical      Affective Functioning: Congruent      Mood: anxious      Level of consciousness:  Alert      Response to Learning: Resistant      Endings: None Reported    Modes of Intervention: Education, Support, and Socialization      Discipline Responsible: Registered Nurse      Signature:  Mary Caballero RN

## 2023-10-14 PROCEDURE — 6370000000 HC RX 637 (ALT 250 FOR IP): Performed by: PSYCHIATRY & NEUROLOGY

## 2023-10-14 PROCEDURE — 1240000000 HC EMOTIONAL WELLNESS R&B

## 2023-10-14 PROCEDURE — 93005 ELECTROCARDIOGRAM TRACING: CPT

## 2023-10-14 RX ADMIN — TRAZODONE HYDROCHLORIDE 50 MG: 50 TABLET ORAL at 21:49

## 2023-10-14 RX ADMIN — DIVALPROEX SODIUM 500 MG: 500 TABLET, EXTENDED RELEASE ORAL at 08:15

## 2023-10-14 NOTE — CARE COORDINATION
Family/Support Name: Korin Hall  Contact #: [phone]  Relationship to Patient: uncle    Placed call to above for collateral information,    Response: no answer, left VM

## 2023-10-14 NOTE — GROUP NOTE
Group Therapy Note    Date: 10/14/2023    Group Start Time: 1430  Group End Time: 1500  Group Topic: Healthy Living/Wellness    [facility]    Pastor Green RN        Group Therapy Note    Attendees: 8/23       Patient's Goal:  To participate in group    Notes:   Active participant, appropriate     Status After Intervention:  Improved    Participation Level: Interactive    Participation Quality: Appropriate      Speech:  normal      Thought Process/Content: Logical      Affective Functioning: Congruent      Mood: euthymic      Level of consciousness:  Alert and Oriented x4      Response to Learning: Progressing to goal      Endings: None Reported    Modes of Intervention: Education, Support, and Socialization      Discipline Responsible: Registered Nurse      Signature:  Pastor Green RN

## 2023-10-14 NOTE — GROUP NOTE
Group Therapy Note    Date: 10/13/2023    Group Start Time: 1900  Group End Time: 2000  Group Topic: Recreational    [facility] 3[facility] MIKKI Araiza        Group Therapy Note    Attendees: 10/21       Patient's Goal:  PT to ineract while playing wii Obviousidealing        Status After Intervention:  Improved    Participation Level: Interactive    Participation Quality: Attentive      Speech:  normal      Thought Process/Content: Logical      Affective Functioning: Congruent      Mood: euthymic      Level of consciousness:  Attentive      Response to Learning: Able to verbalize current knowledge/experience      Endings: None Reported    Modes of Intervention: Activity      Discipline Responsible: PCA      Signature:  Gigi Araiza

## 2023-10-14 NOTE — GROUP NOTE
Group Therapy Note    Date: 10/14/2023    Group Start Time: 1600  Group End Time: 36  Group Topic: Cognitive Skills    [facility] 3W I    Amor Simon RN; Keren Roy RN        Group Therapy Note    Attendees: 10/23       Patient's Goal:  to attend group      Notes:      Status After Intervention:  Improved    Participation Level:  Active Listener and Interactive    Participation Quality: Appropriate      Speech:  normal      Thought Process/Content: Logical  Linear      Affective Functioning: Congruent      Mood: euthymic      Level of consciousness:  Alert and Oriented x4      Response to Learning: Progressing to goal      Endings: None Reported    Modes of Intervention: Education      Discipline Responsible: Registered Nurse      Signature:  Keren Roy RN

## 2023-10-15 LAB — VALPROATE SERPL-MCNC: 38.5 UG/ML (ref 50–100)

## 2023-10-15 PROCEDURE — 6370000000 HC RX 637 (ALT 250 FOR IP): Performed by: PSYCHIATRY & NEUROLOGY

## 2023-10-15 PROCEDURE — 1240000000 HC EMOTIONAL WELLNESS R&B

## 2023-10-15 PROCEDURE — 80164 ASSAY DIPROPYLACETIC ACD TOT: CPT

## 2023-10-15 PROCEDURE — 36415 COLL VENOUS BLD VENIPUNCTURE: CPT

## 2023-10-15 RX ADMIN — DIVALPROEX SODIUM 500 MG: 500 TABLET, EXTENDED RELEASE ORAL at 08:26

## 2023-10-15 RX ADMIN — TRAZODONE HYDROCHLORIDE 50 MG: 50 TABLET ORAL at 21:55

## 2023-10-15 NOTE — CARE COORDINATION
FAMILY COLLATERAL NOTE    Family/Support Name: Rut Kothari #:[phone]  Relationship to Pt[de-identified] Uncle     Family/Support contact aware of hospitalization: Yes    Presenting Symptoms/Current Concerns:  He was in good spirits. Patient is spiritual and appeared in good spirits yesterday when he visited him. Top 3 Life Stressors:   Patient told uncle this is all a misunderstanding. Background History Relevant to Current Hospitalization:  None. Family Mental Health/Substance Use History:   No mental health or substance use history in family. Support Network's Goal for Hospitalization:   Praying daily. Discharge Plan:   Yuan Bal is under impression that he is going to stay with Pilar Red or his mother on discharge. Support Network Supportive of Discharge Plan:   Yes    Support can confirm Safety of Location and Security of Weapons:   He does not know. Support agreeable to Safeguard and Monitor Medications (including Prescription and OTC): Collateral is not living with patient. Identified Barriers to Compliance with Discharge Plan:   None if he continues to pray. Recommendations for Support Network:   Available for follow up call.     Electronically signed by KASSIDY Mota on 10/15/2023 at 9:41 AM       KASSIDY Mota

## 2023-10-16 VITALS
HEART RATE: 108 BPM | SYSTOLIC BLOOD PRESSURE: 137 MMHG | HEIGHT: 76 IN | DIASTOLIC BLOOD PRESSURE: 87 MMHG | BODY MASS INDEX: 29.22 KG/M2 | OXYGEN SATURATION: 96 % | WEIGHT: 240 LBS | RESPIRATION RATE: 18 BRPM | TEMPERATURE: 97.9 F

## 2023-10-16 LAB
EKG ATRIAL RATE: 80 BPM
EKG P AXIS: 65 DEGREES
EKG P-R INTERVAL: 152 MS
EKG Q-T INTERVAL: 364 MS
EKG QRS DURATION: 84 MS
EKG QTC CALCULATION (BAZETT): 419 MS
EKG R AXIS: 80 DEGREES
EKG T AXIS: 51 DEGREES
EKG VENTRICULAR RATE: 80 BPM

## 2023-10-16 PROCEDURE — 99239 HOSP IP/OBS DSCHRG MGMT >30: CPT | Performed by: PSYCHIATRY & NEUROLOGY

## 2023-10-16 PROCEDURE — 6370000000 HC RX 637 (ALT 250 FOR IP): Performed by: PSYCHIATRY & NEUROLOGY

## 2023-10-16 RX ORDER — DIVALPROEX SODIUM 500 MG/1
500 TABLET, EXTENDED RELEASE ORAL DAILY
Qty: 15 TABLET | Refills: 3 | Status: SHIPPED | OUTPATIENT
Start: 2023-10-17

## 2023-10-16 RX ADMIN — DIVALPROEX SODIUM 500 MG: 500 TABLET, EXTENDED RELEASE ORAL at 08:52

## 2023-10-16 NOTE — GROUP NOTE
Group Therapy Note    Date: 10/16/2023    Group Start Time: 1000  Group End Time: 1100  Group Topic: Activity    [facility] 3W Alla Ott        Group Therapy Note    Attendees: 10       Patient's Goal:  To go home    Notes:  Pt was attentive     Status After Intervention:  Unchanged    Participation Level: Interactive    Participation Quality: Attentive      Speech:  normal      Thought Process/Content: Logical      Affective Functioning: Congruent      Mood: euthymic      Level of consciousness:  Alert      Response to Learning: Able to verbalize current knowledge/experience      Endings: None Reported    Modes of Intervention: Activity      Discipline Responsible: Behavorial Health Tech      Signature:   Ana Moser

## 2023-10-16 NOTE — DISCHARGE INSTRUCTIONS
Keep all follow up appointments, take medications as ordered, utilize positive supports, abstain from use of alcohol and drugs. If symptoms return or you feel at risk to yourself or others, please call 911, return the nearest emergency room, or call your local crisis hotline:  [facility]: [phone]: [phone]: [phone]    Due to the [address] [place] [address] Smoking Cessation Group is not currently available. For assistance with quitting smoking please go to https://smokefree.gov. A prescription for an FDA-approved tobacco cessation medication was offered at discharge and the patient refused. Someone from [address] [place] [address] will be calling you tomorrow to follow up on your care. If you don't hear from us, give us a call! [phone].

## 2023-10-16 NOTE — DISCHARGE SUMMARY
content:  Suicidal Ideation:  denies suicidal ideation  Cognition:  oriented to person, place, and time   Concentration intact  Memory intact  Insight good   Judgement fair   Fund of Knowledge adequate      ASSESSMENT:  Patient symptoms are:  [x] Well controlled  [x] Improving  [] Worsening  [] No change      Diagnosis:  Principal Problem:    Bipolar depression ([address])  Resolved Problems:    * No resolved hospital problems. *      LABS:    No results for input(s): \"WBC\", \"HGB\", \"PLT\" in the last 72 hours. No results for input(s): \"NA\", \"K\", \"CL\", \"CO2\", \"BUN\", \"CREATININE\", \"GLUCOSE\" in the last 72 hours. No results for input(s): \"BILITOT\", \"ALKPHOS\", \"AST\", \"ALT\" in the last 72 hours. Lab Results   Component Value Date/Time    LABAMPH Neg 10/10/2023 03:00 PM    BARBSCNU Neg 10/10/2023 03:00 PM    LABBENZ Neg 10/10/2023 03:00 PM    LABMETH Neg 10/10/2023 03:00 PM    OPIATESCREENURINE Neg 10/10/2023 03:00 PM    PHENCYCLIDINESCREENURINE Neg 10/10/2023 03:00 PM    ETOH <10 10/10/2023 03:09 PM     Lab Results   Component Value Date/Time    TSH 1.040 10/10/2023 03:09 PM     No results found for: \"LITHIUM\"  Lab Results   Component Value Date    VALPROATE 38.5 (L) 10/15/2023       RISK ASSESSMENT AT DISCHARGE: Low risk for suicide and homicide. Treatment Plan:  Reviewed current Medications with the patient. Education provided on the complaince with treatment. Risks, benefits, side effects, drug-to-drug interactions and alternatives to treatment were discussed. Encourage patient to attend outpatient follow up appointment and therapy. Patient was advised to call the outpatient provider, visit the nearest ED or call 911 if symptoms are not manageable. Patient's family member was contacted prior to the discharge.          Medication List        START taking these medications      divalproex 500 MG extended release tablet  Commonly known as: DEPAKOTE ER  Take 1 tablet by mouth daily  Start taking on: October

## 2023-10-16 NOTE — DISCHARGE INSTR - DIET
Good nutrition is important when healing from an illness, injury, or surgery. Follow any nutrition recommendations given to you during your hospital stay. If you were given an oral nutrition supplement while in the hospital, continue to take this supplement at home. You can take it with meals, in-between meals, and/or before bedtime. These supplements can be purchased at most local grocery stores, pharmacies, and chain [employer]-stores. If you have any questions about your diet or nutrition, call the hospital and ask for the dietitian.   As tolerated

## 2023-10-16 NOTE — PROGRESS NOTES
[address] NOTE       10/15/2023     Patient was seen and examined in person, Chart reviewed   Patient's case discussed with staff/team    Chief Complaint: Depression    Interim History:   I saw patient today's up on the unit using the phone. He smiles at me he is bright and pleasant. He vehemently denies suicidal ideations intent or plan he has any auditory or visual hallucinations. He states has been eating well and sleeping well and no neurovegetative signs of depression he does attend select groups. No behavioral disturbances noted on the unit. He is discharge focused.       Appetite:   [x] Normal/Unchanged  [] Increased  [x] Decreased      Sleep:       [x] Normal/Unchanged  [] Fair       [] Poor              Energy:    [x] Normal/Unchanged  [] Increased  [x] Decreased        SI [] Present  [x] Absent    HI  []Present  [x] Absent     Aggression:  [] yes  [x] no    Patient is [] able  [x] unable to CONTRACT FOR SAFETY     PAST MEDICAL/PSYCHIATRIC HISTORY:   Past Medical History:   Diagnosis Date    Seizures ([address])        FAMILY/SOCIAL HISTORY:  Family History   Problem Relation Age of Onset    No Known Problems Mother     No Known Problems Father     No Known Problems Sister     No Known Problems Brother     No Known Problems Maternal Aunt     No Known Problems Maternal Uncle     No Known Problems Paternal Aunt     No Known Problems Paternal Uncle     No Known Problems Maternal Grandmother     No Known Problems Maternal Grandfather     No Known Problems Paternal Grandmother     No Known Problems Paternal Grandfather     No Known Problems Maternal Cousin     No Known Problems Paternal Cousin      Social History     Socioeconomic History    Marital status: Single     Spouse name: Not on file    Number of children: 1    Years of education: 10    Highest education level: Not on file   Occupational History    Not on file   Tobacco Use    Smoking status: Former
[address] [place] FOLLOW-UP NOTE       10/13/2023     Patient was seen and examined in person, Chart reviewed   Patient's case discussed with staff/team    Chief Complaint: Depression    Interim History:     Patient continue to ruminate about the relationship breakup  Still feel depressed  Hopeless feeling but not suicidal  Pt wants to apply for insurance while in the hospital  Slept better with meds  Depakote level due on     Appetite:   [] Normal/Unchanged  [] Increased  [x] Decreased      Sleep:       [] Normal/Unchanged  [x] Fair       [] Poor              Energy:    [] Normal/Unchanged  [] Increased  [x] Decreased        SI [x] Present  [] Absent    HI  []Present  [x] Absent     Aggression:  [] yes  [x] no    Patient is [] able  [x] unable to CONTRACT FOR SAFETY     PAST MEDICAL/PSYCHIATRIC HISTORY:   Past Medical History:   Diagnosis Date    Seizures ([address])        FAMILY/SOCIAL HISTORY:  Family History   Problem Relation Age of Onset    No Known Problems Mother     No Known Problems Father     No Known Problems Sister     No Known Problems Brother     No Known Problems Maternal Aunt     No Known Problems Maternal Uncle     No Known Problems Paternal Aunt     No Known Problems Paternal Uncle     No Known Problems Maternal Grandmother     No Known Problems Maternal Grandfather     No Known Problems Paternal Grandmother     No Known Problems Paternal Grandfather     No Known Problems Maternal Cousin     No Known Problems Paternal Cousin      Social History     Socioeconomic History    Marital status: Single     Spouse name: Not on file    Number of children: 1    Years of education: 10    Highest education level: Not on file   Occupational History    Not on file   Tobacco Use    Smoking status: Former     Types: Cigarettes     Quit date: 3/12/2019     Years since quittin.5    Smokeless tobacco: Never   Vaping Use    Vaping Use: Never used   Substance and Sexual
[address] [place] FOLLOW-UP NOTE       10/14/2023     Patient was seen and examined in person, Chart reviewed   Patient's case discussed with staff/team    Chief Complaint: Depression    Interim History:   I saw patient today in his room he is malodorous states that he feels \"tired today. He states that he wants to sleep. He states that he is feeling [place]. \"  Denies suicidal ideations intent or plan denies auditory or visual hallucinations he is is guarded seems somewhat isolative because only 1 or 2 word answers and offers very little conversation      Appetite:   [] Normal/Unchanged  [] Increased  [x] Decreased      Sleep:       [] Normal/Unchanged  [x] Fair       [] Poor              Energy:    [] Normal/Unchanged  [] Increased  [x] Decreased        SI [x] Present  [] Absent    HI  []Present  [x] Absent     Aggression:  [] yes  [x] no    Patient is [] able  [x] unable to CONTRACT FOR SAFETY     PAST MEDICAL/PSYCHIATRIC HISTORY:   Past Medical History:   Diagnosis Date    Seizures ([address])        FAMILY/SOCIAL HISTORY:  Family History   Problem Relation Age of Onset    No Known Problems Mother     No Known Problems Father     No Known Problems Sister     No Known Problems Brother     No Known Problems Maternal Aunt     No Known Problems Maternal Uncle     No Known Problems Paternal Aunt     No Known Problems Paternal Uncle     No Known Problems Maternal Grandmother     No Known Problems Maternal Grandfather     No Known Problems Paternal Grandmother     No Known Problems Paternal Grandfather     No Known Problems Maternal Cousin     No Known Problems Paternal Cousin      Social History     Socioeconomic History    Marital status: Single     Spouse name: Not on file    Number of children: 1    Years of education: 10    Highest education level: Not on file   Occupational History    Not on file   Tobacco Use    Smoking status: Former     Types: Cigarettes     Quit date: 3/12/2019
Behavioral Services  Medicare Certification Upon Admission    I certify that this patient's inpatient psychiatric hospital admission is medically necessary for:    [x] (1) Treatment which could reasonably be expected to improve this patient's condition,       [x] (2) Or for diagnostic study;     AND     [x](2) The inpatient psychiatric services are provided while the individual is under the care of a physician and are included in the individualized plan of care.     Estimated length of stay/service 3-5    Plan for post-hospital care OP care    Electronically signed by Carito Seals MD on 10/11/2023 at 9:57 AM
Discharge instructions reviewed verbally and in writing including f/u appointments. Patient verbalizes understanding and signed as such. All belongings returned for discharge. Patient provided with food/drug interaction booklet. Patient denies SI, HI, A/V hallucinations, mood is stable.
Morning Community Meeting Topics    Dudley Zuniga attended the morning community meeting on 10/11/23. Topics discussed today     [x] Introduction  Day of the week and date  Mask distribution  Current mask requirements  [x]Teams  Explanation of  Green and Blue team criteria  Nurses assigned to each team for today  Explanation about green and blue paper  Date  Patient's Name  Patient's Nurse  Goals  [x] Visitation  Announce the visiting hours for the day  Announce which team is allowed to have visitors for the day  Review any updated Covid 19 requirements for visitors during visitation  Vaccine Card or negative Covid test within 48 hours of visit  State Identification  Patients are reminded to alert the  at least 1 hour before visitation   [x] Unit Orientation  Coffee use  Phone location and etiquette  Shower locations  Raymond and dryer location and process  Common area expectations  Staff rounds expectation  [x] Meals   Educate patient to the menu  The patient is encouraged to fill out the menu to get preferences at mealtime  The patient is educated that if they do not fill out the menu, they will get the standard tray  The coffee pot is decaf, patient encouraged to order regular coffee from menu.   Educate patient to the meal process  Patient encouraged to eat snacks provided twice daily  Snacks may stay in patient room     [x] Discharge Process  Discharge expectations  Fill out the survey after discharge   [x] Hygiene  Daily showers encouraged  Showers availability discussed   Daily dressing encouraged  Discussed wearing street clothing  Education provided on where to place linens and clothing  Linens in the hamper  personal clothing does not go into the linen hamper  [x] Group   Patient encouraged to attend group provided  Time of Group Meetings discussed  Gentle reminder that attendance is a Physician order  [x] Movement  Chair exercises completed  Stretching completed  Notes: GOAL: \" TO SPEAK WITH
Morning Community Meeting Topics    Gris Hunter attended the morning community meeting on 10/15/23. Topics discussed today     [x] Introduction  Day of the week and date  Mask distribution  Current mask requirements  [x]Teams  Explanation of  Green and Blue team criteria  Nurses assigned to each team for today  Explanation about green and blue paper  Date  Patient's Name  Patient's Nurse  Goals  [x] Visitation  Announce the visiting hours for the day  Announce which team is allowed to have visitors for the day  Review any updated Covid 19 requirements for visitors during visitation  Vaccine Card or negative Covid test within 48 hours of visit  State Identification  Patients are reminded to alert the  at least 1 hour before visitation   [x] Unit Orientation  Coffee use  Phone location and etiquette  Shower locations  Raymond and dryer location and process  Common area expectations  Staff rounds expectation  [x] Meals   Educate patient to the menu  The patient is encouraged to fill out the menu to get preferences at mealtime  The patient is educated that if they do not fill out the menu, they will get the standard tray  The coffee pot is decaf, patient encouraged to order regular coffee from menu.   Educate patient to the meal process  Patient encouraged to eat snacks provided twice daily  Snacks may stay in patient room     [x] Discharge Process  Discharge expectations  Fill out the survey after discharge   [x] Hygiene  Daily showers encouraged  Showers availability discussed   Daily dressing encouraged  Discussed wearing street clothing  Education provided on where to place linens and clothing  Linens in the hamper  personal clothing does not go into the linen hamper  [x] Group   Patient encouraged to attend group provided  Time of Group Meetings discussed  Gentle reminder that attendance is a Physician order  [x] Movement  Chair exercises completed  Stretching completed  Notes:  GOAL: \"TO GET THROUGH
Morning Community Meeting Topics    Jemma Naranjo attended the morning community meeting on 10/12/23. Topics discussed today     [x] Introduction  Day of the week and date  Mask distribution  Current mask requirements  [x]Teams  Explanation of  Green and Blue team criteria  Nurses assigned to each team for today  Explanation about green and blue paper  Date  Patient's Name  Patient's Nurse  Goals  [x] Visitation  Announce the visiting hours for the day  Announce which team is allowed to have visitors for the day  Review any updated Covid 19 requirements for visitors during visitation  Vaccine Card or negative Covid test within 48 hours of visit  State Identification  Patients are reminded to alert the  at least 1 hour before visitation   [x] Unit Orientation  Coffee use  Phone location and etiquette  Shower locations  Raymond and dryer location and process  Common area expectations  Staff rounds expectation  [x] Meals   Educate patient to the menu  The patient is encouraged to fill out the menu to get preferences at mealtime  The patient is educated that if they do not fill out the menu, they will get the standard tray  The coffee pot is decaf, patient encouraged to order regular coffee from menu.   Educate patient to the meal process  Patient encouraged to eat snacks provided twice daily  Snacks may stay in patient room     [x] Discharge Process  Discharge expectations  Fill out the survey after discharge   [x] Hygiene  Daily showers encouraged  Showers availability discussed   Daily dressing encouraged  Discussed wearing street clothing  Education provided on where to place linens and clothing  Linens in the hamper  personal clothing does not go into the linen hamper  [x] Group   Patient encouraged to attend group provided  Time of Group Meetings discussed  Gentle reminder that attendance is a Physician order  [x] Movement  Chair exercises completed  Stretching completed  Notes: GOAL: \" SPEAK WITH
Morning Community Meeting Topics    Manuel Mei attended the morning community meeting on 10/16/23. Topics discussed today     [x] Introduction  Day of the week and date  Mask distribution  Current mask requirements  [x]Teams  Explanation of  Green and Blue team criteria  Nurses assigned to each team for today  Explanation about green and blue paper  Date  Patient's Name  Patient's Nurse  Goals  [x] Visitation  Announce the visiting hours for the day  Announce which team is allowed to have visitors for the day  Review any updated Covid 19 requirements for visitors during visitation  Vaccine Card or negative Covid test within 48 hours of visit  State Identification  Patients are reminded to alert the  at least 1 hour before visitation   [x] Unit Orientation  Coffee use  Phone location and etiquette  Shower locations  Raymond and dryer location and process  Common area expectations  Staff rounds expectation  [x] Meals   Educate patient to the menu  The patient is encouraged to fill out the menu to get preferences at mealtime  The patient is educated that if they do not fill out the menu, they will get the standard tray  The coffee pot is decaf, patient encouraged to order regular coffee from menu.   Educate patient to the meal process  Patient encouraged to eat snacks provided twice daily  Snacks may stay in patient room     [x] Discharge Process  Discharge expectations  Fill out the survey after discharge   [x] Hygiene  Daily showers encouraged  Showers availability discussed   Daily dressing encouraged  Discussed wearing street clothing  Education provided on where to place linens and clothing  Linens in the hamper  personal clothing does not go into the linen hamper  [x] Group   Patient encouraged to attend group provided  Time of Group Meetings discussed  Gentle reminder that attendance is a Physician order  [x] Movement  Chair exercises completed  Stretching completed  Notes:
PT did not attend 1600 group
PT did not attend 7pm group
PT. ATTENDED 1000 AM ACTIVITY GROUP BUT DID NOT STAY LONG.
PT. ATTENDED AND PARTICIPATED IN 10 AM ACTIVITY GROUP.
PT. DID NOT ATTEND 0900 COMMUNITY MEETING AND STRETCHING DESPITE STAFF ENCOURAGEMENT. GOAL: \" WAITING FOR VISITING HOURS TO DAY\".
PT. DID NOT ATTEND 1000 AM ACTIVITY GROUP DESPITE STAFF ENCOURAGEMENT.
PT. DID NOT ATTEND 1000 AM ACTIVITY GROUP DESPITE STAFF ENCOURAGEMENT.
Patient arrived to unit via wheelchair and was passively searched for contraband. No contraband found. Patient skin assessed by this writer and Sirena PRICE. Skin intact.
Patient attended and participated in recreational group
Patient attended and participated in wrap-up group
Patient changed his HIPPA Code to [facility]. He states he does not want his girlfriend/ex-girlfriend to know any information about him. Charge nurse and  notified.
Patient declined to attend [phone] group, despite staff encouragement.
Patient did not attend group, despite encouragement from staff.
Patient did not attend recreational group despite staff encouragement.
Patient did not attend wrap-up group despite staff encouragement.
Patient is 34year old male that was brought into the ED by [employer] on a pink slip. Patient was found walking by bridge after he had told his ex-girlfriend that he was going to jump off a bridge or suicide by . Patient reports he does feel suicidal and thinking of what he could do but has no plan. Recently broke up with his girlfriend and feels it is just too much and he can't handle  it. Patient flat, has poor eye contact, depressed mood. Anxious and tearful but is cooperative. Patient is homeless currently living in his car for the past 8 months. Prior to this lived with Mom but reports he is unable to stay there any longer. Born and raised in [place]. Attended highschool but dropped out in the 10 th grade. Never . Has 1 child. Unknown if patient works. Patient currently denies suicidal/homicidal ideation and verbally contracts for safety on the unit.
Pt left unit with staff, escorted to lobby and collected by family for ride home. Belongings given to pt. Pt denies any current suicidal ideation, homicidal ideation or hallucinations. Mood and affect stable.
Pt medicated with PRN trazodone per request @  for sleep.
Pt out on unit, but not social with peers, flat incongruent affect. Pt observed sitting in dayroom, preoccupied with Telephone conversations. Pt evasive, guarded, answering questions, one or two word responses. Pt reports showering yesterday. Pt reports good appetite. Pt reports poor sleep, due to trouble falling asleep, staying asleep. Pt rates anxiety 2/10. Pt rates depression 5/10. On a scale from 1 through 10, 10 being the highest. Pt reports attending groups. Pt denies SI, HI and A/V hallucinations. Will continue to monitor.
Pt refused flu vax.
Pt reports he has no depression & or aniety @ this time, denies SI/HI/AVH. Pt reports attending all groups. Pt reports good appetite, pt reports sleeping all night with naps during the day.
Pt. refused to attend the 0900 community meeting.  Electronically signed by Mario Weathers on 10/13/2023 at 9:39 AM
Report per Wendy Hanley. Patient brought into the ED by [facility] on a pink slip. Patient was found walking by bridge after he had told his ex-girlfriend that he was going to jump off a bridge or suicide by . Patient reports he does feel suicidal and thinking of what he could do but has no plan. Recently broke up with his girlfriend and feels it is just too much and he can't handle  it. Patient flat, has poor eye contact, depressed mood. Anxious and tearful but is cooperative. Patient is homeless currently living in his car for the past 8 months. Prior to this lived with Mom but reports he is unable to stay there any longer. Born and raised in [place]. Attended highschool but dropped out in the 10 th grade. Never . Has 1 child. Unknown if patient works.
Requested trazodone to help with sleep.
The patient denied depression and anxiety. Pt also denied SI/HI/AVH. The pt reports poor appetite, sleep in normal range, and taking a shower today. The pt has flat affect and avoids eye contact. The pt isolates to his room and is not social with peers.
This patient did not attend 0 group despite encouragement.
No results found for: \"LITHIUM\"  No results found for: \"VALPROATE\", \"CBMZ\"    RISK ASSESSMENT: high risk of impulsivity    Treatment Plan:  Reviewed current Medications with the patient. Depakote started  Risks, benefits, side effects, drug-to-drug interactions and alternatives to treatment were discussed. Collateral information:   CD evaluation  Encourage patient to attend group and other milieu activities.   Discharge planning discussed with the patient and treatment team.    PSYCHOTHERAPY/COUNSELING:  [x] Therapeutic interview  [x] Supportive  [] CBT  [] Ongoing  [] Other    [x] Patient continues to need, on a daily basis, active treatment furnished directly by or requiring the supervision of inpatient psychiatric personnel      Anticipated Length of stay:        Electronically signed by Joanna Rosario MD on 10/12/2023 at 3:39 PM

## 2024-10-09 ENCOUNTER — HOSPITAL ENCOUNTER (EMERGENCY)
Age: 29
Discharge: HOME | End: 2024-10-09
Payer: MEDICAID

## 2024-10-09 VITALS
DIASTOLIC BLOOD PRESSURE: 93 MMHG | BODY MASS INDEX: 33.57 KG/M2 | SYSTOLIC BLOOD PRESSURE: 149 MMHG | RESPIRATION RATE: 16 BRPM | OXYGEN SATURATION: 99 % | HEART RATE: 98 BPM | TEMPERATURE: 98.4 F | HEIGHT: 75 IN | WEIGHT: 270 LBS

## 2024-10-09 DIAGNOSIS — L02.91 ABSCESS: Primary | ICD-10-CM

## 2024-10-09 PROCEDURE — 99283 EMERGENCY DEPT VISIT LOW MDM: CPT

## 2024-10-09 RX ORDER — SULFAMETHOXAZOLE/TRIMETHOPRIM 800-160 MG
2 TABLET ORAL 2 TIMES DAILY
Qty: 40 TABLET | Refills: 0 | Status: SHIPPED | OUTPATIENT
Start: 2024-10-09 | End: 2024-10-19

## 2024-10-09 ASSESSMENT — ENCOUNTER SYMPTOMS
VOMITING: 0
APNEA: 0
SHORTNESS OF BREATH: 0
ABDOMINAL PAIN: 0
TROUBLE SWALLOWING: 0
ALLERGIC/IMMUNOLOGIC NEGATIVE: 1
COLOR CHANGE: 0
NAUSEA: 0
ROS SKIN COMMENTS: ABSCESS
COUGH: 0
DIARRHEA: 0
EYE PAIN: 0

## 2024-10-09 ASSESSMENT — PAIN - FUNCTIONAL ASSESSMENT: PAIN_FUNCTIONAL_ASSESSMENT: NONE - DENIES PAIN

## 2024-10-09 NOTE — ED PROVIDER NOTES
[facility] ED  eMERGENCYdEPARTMENT eNCOUnter        Pt Name: Roel Galarza  MRN: [id number]  Birthdate 1995of evaluation: 10/9/2024  Provider:Sinai Tamez PA-C  5:22 PM EDT    CHIEF COMPLAINT       Chief Complaint   Patient presents with    Abscess     Upper right chest          HISTORY OF PRESENT ILLNESS  (Location/Symptom, Timing/Onset, Context/Setting, Quality, Duration, Modifying Factors, Severity.)   Roel Galarza is a 29 y.o. male who presents to the emergency department for evaluation of possible spider bite to right upper chest x 3 days.  Patient reports that he noticed a bump in this area 3 days ago and has been getting bigger and more painful since then.  He came to the emergency department to make sure there is nothing else that need to be done for the possible spider bite.  Denies any drainage.  Denies any fevers, cough, congestion, shortness of breath, chest pain, abdominal pain, nausea, vomiting, diarrhea.  Patient denies actually see a spider bite him but reports there was a spider in the car a few days ago.    HPI    Nursing Notes were reviewed and I agree.    REVIEW OF SYSTEMS    (2-9 systems for level 4, 10 or more for level 5)     Review of Systems   Constitutional:  Negative for diaphoresis, fatigue and fever.   HENT:  Negative for congestion, hearing loss and trouble swallowing.    Eyes:  Negative for pain.   Respiratory:  Negative for apnea, cough and shortness of breath.    Cardiovascular:  Negative for chest pain.   Gastrointestinal:  Negative for abdominal pain, diarrhea, nausea and vomiting.   Endocrine: Negative.    Genitourinary:  Negative for hematuria.   Musculoskeletal:  Negative for neck pain and neck stiffness.   Skin:  Negative for color change, pallor and rash.        Abscess    Allergic/Immunologic: Negative.    Neurological:  Negative for dizziness, weakness and numbness.   Hematological: Negative.    Psychiatric/Behavioral: Negative.     All other systems 
room air

## 2024-10-09 NOTE — DISCHARGE INSTRUCTIONS
Start taking antibiotic twice a day for 10 days to treat abscess.  Monitor for signs of worsening infection as discussed.  Schedule follow-up appointment with your PCP next week for reevaluation as discussed.  Return to the emergency department for any new or worsening symptoms.

## 2024-10-28 RX ORDER — DIVALPROEX SODIUM 500 MG/1
500 TABLET, FILM COATED, EXTENDED RELEASE ORAL DAILY
Qty: 15 TABLET | Refills: 3 | OUTPATIENT
Start: 2024-10-28